# Patient Record
Sex: FEMALE | Race: BLACK OR AFRICAN AMERICAN | Employment: FULL TIME | URBAN - METROPOLITAN AREA
[De-identification: names, ages, dates, MRNs, and addresses within clinical notes are randomized per-mention and may not be internally consistent; named-entity substitution may affect disease eponyms.]

---

## 2018-05-07 ENCOUNTER — TELEPHONE (OUTPATIENT)
Dept: FAMILY MEDICINE CLINIC | Age: 38
End: 2018-05-07
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
## 2018-05-07 NOTE — TELEPHONE ENCOUNTER
Future Appointments  Date Time Provider Aleksandar Rico   5/9/2018 1:30 PM Camille Edgar MD [facility]      Patient made a ware that an appointment was needed in order for the referral an appointment was needed.

## 2018-05-09 ENCOUNTER — OFFICE VISIT (OUTPATIENT)
Dept: FAMILY MEDICINE CLINIC | Age: 38
End: 2018-05-09

## 2018-05-09 VITALS
WEIGHT: 205 LBS | HEART RATE: 88 BPM | HEIGHT: 64 IN | SYSTOLIC BLOOD PRESSURE: 124 MMHG | RESPIRATION RATE: 20 BRPM | BODY MASS INDEX: 35 KG/M2 | DIASTOLIC BLOOD PRESSURE: 86 MMHG | OXYGEN SATURATION: 98 % | TEMPERATURE: 98.7 F

## 2018-05-09 DIAGNOSIS — S46.812A TRAPEZIUS STRAIN, LEFT, INITIAL ENCOUNTER: ICD-10-CM

## 2018-05-09 DIAGNOSIS — S16.1XXA STRAIN OF NECK MUSCLE, INITIAL ENCOUNTER: Primary | ICD-10-CM

## 2018-05-09 RX ORDER — IBUPROFEN 800 MG/1
TABLET ORAL
COMMUNITY
End: 2018-05-09 | Stop reason: SDUPTHER

## 2018-05-09 RX ORDER — IBUPROFEN 800 MG/1
TABLET ORAL
Qty: 45 TAB | Refills: 1 | Status: SHIPPED | OUTPATIENT
Start: 2018-05-09 | End: 2019-01-02

## 2018-05-09 RX ORDER — CYCLOBENZAPRINE HCL 10 MG
TABLET ORAL
COMMUNITY
End: 2019-01-02 | Stop reason: ALTCHOICE
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
## 2018-05-09 NOTE — PATIENT INSTRUCTIONS
Apply warm wet compresses frequently, avoid painful activity. Take ibuprofen 800 mg, one tablet up to every 8 hours with food, stop for abdominal pain or dark stools.   Physical therapy referral
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
## 2018-05-09 NOTE — LETTER
NOTIFICATION RETURN TO WORK / SCHOOL 
 
5/9/2018 1:57 PM 
 
Ms. [address] [facility] [address] To Whom It May Concern: 
 
Judith [address] is currently under the care of [address] [place] [address]. She will return to work/school on: 5/12/2018 If there are questions or concerns please have the patient contact our office. Sincerely, Donovan Jamison MD

## 2018-05-17 ENCOUNTER — HOSPITAL ENCOUNTER (OUTPATIENT)
Dept: PHYSICAL THERAPY | Age: 38
Discharge: HOME | End: 2018-05-17
Payer: COMMERCIAL

## 2018-05-17 PROCEDURE — 97110 THERAPEUTIC EXERCISES: CPT

## 2018-05-17 PROCEDURE — 97162 PT EVAL MOD COMPLEX 30 MIN: CPT
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
## 2018-05-23 ENCOUNTER — HOSPITAL ENCOUNTER (OUTPATIENT)
Dept: PHYSICAL THERAPY | Age: 38
Discharge: HOME | End: 2018-05-23
Payer: COMMERCIAL

## 2018-05-23 PROCEDURE — 97110 THERAPEUTIC EXERCISES: CPT

## 2018-05-23 NOTE — PROGRESS NOTES
PHYSICAL THERAPY - DAILY TREATMENT NOTE    Patient Name: Tori Lutz        Date: 2018  : 1980   yes Patient  Verified  Visit #:   2     Insurance: Payor: Daisha Carrera / Plan: [address] PT / Product Type: Commerical /      In time: 200 Out time: 245   Total Treatment Time: 45     Medicare Time Tracking (below)   Total Timed Codes (min):  30 1:1 Treatment Time:  n/a     TREATMENT AREA =  Neck pain [M54.2]  Left shoulder pain [M25.512]    SUBJECTIVE  Pain Level (on 0 to 10 scale):  5  / 10   Medication Changes/New allergies or changes in medical history, any new surgeries or procedures?    no  If yes, update Summary List   Subjective Functional Status/Changes:  []  No changes reported     \"It's hard to change positions or stay in one position too long\"          OBJECTIVE  Modalities Rationale:     decrease pain and increase tissue extensibility to improve patient's ability to perform functional mobility/ADLs and attain goals.    min [] Estim, type/location:                                      []  att     []  unatt     []  w/US     []  w/ice    []  w/heat    min []  Mechanical Traction: type/lbs                   []  pro   []  sup   []  int   []  cont    []  before manual    []  after manual    min []  Ultrasound, settings/location:      min []  Iontophoresis w/ dexamethasone, location:                                               []  take home patch       []  in clinic   10 min []  Ice     [x]  Heat    location/position: C/s; Supine with LE wedge    min []  Vasopneumatic Device, press/temp:     min []  Other:    [] Skin assessment post-treatment (if applicable):    []  intact    []  redness- no adverse reaction     []redness - adverse reaction:        30 min Therapeutic Exercise:  [x]  See flow sheet   Rationale:   increase ROM and decrease symptoms to improve the patients ability to perform ADLs/IADLs, functional mobility without increased pain/symptoms        min Patient Education:  yes  Reviewed HEP   []  Progressed/Changed HEP based on: Other Objective/Functional Measures:    -new exercises added as per flow sheet with vc's provided for form/technique 100% of the time. Post Treatment Pain Level (on 0 to 10) scale:   3  / 10     ASSESSMENT  Assessment/Changes in Function:     Pt with slow, careful movements to head/neck and transfers. Pt with mm guarding and cues to reduce tone during exercises. Pt monitored to perform all exercises within tolerance today and no adverse signs/sx's.       []  See Progress Note/Recertification   Patient will continue to benefit from skilled PT services to modify and progress therapeutic interventions, address functional mobility deficits, address ROM deficits, address strength deficits, analyze and address soft tissue restrictions and analyze and cue movement patterns to attain remaining goals. Progress toward goals / Updated goals:    Initiated exercises this session     PLAN  []  Upgrade activities as tolerated yes Continue plan of care   []  Discharge due to :    []  Other:      Therapist: Adrianna Capone.  Kathleen Tse PT    Date: 5/23/2018 Time: 1:05 PM     Future Appointments  Date Time Provider Aleksandar Rico   5/23/2018 2:00 PM [address]   5/29/2018 10:30 AM [place], [facility]   5/31/2018 11:00 AM [place], [facility]   6/5/2018 10:30 AM Clinton Santiago [facility]   6/7/2018 10:30 AM Clinton Santiago [facility]   6/13/2018 11:00 AM Rashid Guido [facility]   6/14/2018 10:30 AM Rashid Guido [facility]   6/19/2018 10:30 AM Art Kareem [facility]   6/21/2018 10:00 AM Rashid Guido [facility]   6/26/2018 10:30 AM Matthew [facility]   6/28/2018 10:30 AM Rashid Guido [facility]

## 2018-05-29 ENCOUNTER — HOSPITAL ENCOUNTER (OUTPATIENT)
Dept: PHYSICAL THERAPY | Age: 38
Discharge: HOME | End: 2018-05-29
Payer: COMMERCIAL

## 2018-05-29 PROCEDURE — 97110 THERAPEUTIC EXERCISES: CPT

## 2018-05-29 NOTE — PROGRESS NOTES
PHYSICAL THERAPY - DAILY TREATMENT NOTE    Patient Name: Bhumi Hart        Date: 2018  : 1980   YES Patient  Verified  Visit #:   3    Insurance: Payor: Bharati Billingsley / Plan: [address] PT / Product Type: Commerical /      In time: 10:30 Out time: 11:25   Total Treatment Time: 55     Medicare Time Tracking (below)   Total Timed Codes (min):  NA 1:1 Treatment Time:  NA     TREATMENT AREA =  C/S, left shd    SUBJECTIVE    Pain Level (on 0 to 10 scale):  6  / 10   Medication Changes/New allergies or changes in medical history, any new surgeries or procedures? NO    If yes, update Summary List   Subjective Functional Status/Changes:  []  No changes reported     \"Its up a little.  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\"          OBJECTIVE    Modalities Rationale:    decrease pain and increase tissue extensibility to improve patient's ability to tolerate Te   min [] Estim, type/location:                                      []  att     []  unatt     []  w/US     []  w/ice    []  w/heat    min []  Mechanical Traction: type/lbs                   []  pro   []  sup   []  int   []  cont    []  before manual    []  after manual    min []  Ultrasound, settings/location:      min []  Iontophoresis w/ dexamethasone, location:                                               []  take home patch-6hour remove at        []  in clinic   10 min []  Ice     [x]  Heat    location/position: Supine with wedge    min []  Vasopneumatic Device, press/temp:     min []  Other:    [] Skin assessment post-treatment (if applicable):    []  intact    []  redness- no adverse reaction     []redness - adverse reaction:      45 min Therapeutic Exercise:  [x]  See flow sheet   Rationale:      increase ROM, increase strength and posture to improve the patients ability to decrease stress on spine      min Patient Education:  YES  Reviewed HEP   []  Progressed/Changed HEP based on:   Cont HEP Other Objective/Functional Measures:    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  Pt with c/o of fatigue throughout session but no increase in pain     Post Treatment Pain Level (on 0 to 10) scale:   3  / 10     ASSESSMENT    Assessment/Changes in Function:     Pt able tolerate increase activity today compared to prior session     []  See Progress Note/Recertification   Patient will continue to benefit from skilled PT services to analyze,, cue,, progress,, modify,, demonstrate,, instruct, and address, movement patterns,, therapeutic interventions,, postural abnormalities,, soft tissue restrictions,, ROM,, strength,, functional mobility,, body mechanics/ergonomics, and home and community integration, to attain remaining goals. Progress toward goals / Updated goals: · Short Term Goals: To be accomplished in  2  weeks:  1. Patient will demonstrate compliance with HEP. 2. Patient will demonstrate greater than or equal to 50 degrees right C/S rot AROM to facilitate driving. 3. Patient will demonstrate ability to centralize symptoms to level of C/S to facilitate independence with symptom management. pt reporting symptoms at central spine today  · Long Term Goals: To be accomplished in  4  weeks:  1. Patient will demonstrate independence with HEP. 2. Patient will demonstrate greater than or equal to 55 degrees B C/S rot AROM to facilitate driving. 3. Patient will score greater than or equal to 65/100 on FOTO to indicate improved function.      PLAN    []  Upgrade activities as tolerated YES Continue plan of care   []  Discharge due to :    []  Other:      Therapist: Jose Mcnamara DPT    Date: 5/29/2018 Time: 10:48 AM   Future Appointments  Date Time Provider Aleksandar Rico   5/31/2018 11:00 AM Edgardo Rod PT [facility]   6/5/2018 10:30 AM Fe Sotelo, [facility]   6/7/2018 10:30 AM Fe Sotelo, [facility]   6/13/2018 11:00 AM Dirk Willard [facility]   6/14/2018 10:30 AM Daljit Schultz PT [facility]   6/19/2018 10:30 AM Baldo Chapman, PT [facility]   6/21/2018 10:00 AM Daljit Schultz PT [facility]   [facility]   6/28/2018 10:30 AM Daljit Schultz PT [facility]

## 2018-05-31 ENCOUNTER — HOSPITAL ENCOUNTER (OUTPATIENT)
Dept: PHYSICAL THERAPY | Age: 38
Discharge: HOME | End: 2018-05-31
Payer: COMMERCIAL

## 2018-05-31 PROCEDURE — 97140 MANUAL THERAPY 1/> REGIONS: CPT

## 2018-05-31 PROCEDURE — 97110 THERAPEUTIC EXERCISES: CPT

## 2018-05-31 NOTE — PROGRESS NOTES
PHYSICAL THERAPY - DAILY TREATMENT NOTE    Patient Name: Adelita Singhach        Date: 2018  : 1980   YES Patient  Verified  Visit #:   4     Insurance: Payor: Alexsandra Lundberg / [address] / Product Type: Commerical /      In time: 11:00 Out time: 12:00   Total Treatment Time: 60     Medicare Time Tracking (below)   Total Timed Codes (min):  NA 1:1 Treatment Time:  NA     TREATMENT AREA =  C/S, left shd    SUBJECTIVE    Pain Level (on 0 to 10 scale):  4  / 10 shd   Medication Changes/New allergies or changes in medical history, any new surgeries or procedures?     NO    If yes, update Summary List   Subjective Functional Status/Changes:  []  No changes reported     \"My shoulder bothers me when I drive since Im left handed\"          OBJECTIVE    Modalities Rationale:    decrease inflammation, decrease pain and increase tissue extensibility to improve patient's ability to turn head without pain   min [] Estim, type/location:                                      []  att     []  unatt     []  w/US     []  w/ice    []  w/heat    min []  Mechanical Traction: type/lbs                   []  pro   []  sup   []  int   []  cont    []  before manual    []  after manual    min []  Ultrasound, settings/location:      min []  Iontophoresis w/ dexamethasone, location:                                               []  take home patch-6hour remove at        []  in clinic   10 min []  Ice     [x]  Heat    location/position: Supine with wedge    min []  Vasopneumatic Device, press/temp:     min []  Other:    [] Skin assessment post-treatment (if applicable):    []  intact    []  redness- no adverse reaction     []redness - adverse reaction:      50  (30) min Therapeutic Exercise:  [x]  See flow sheet   Rationale:      increase ROM and increase strength to improve the patients ability to perform ADLs     10 min Manual Therapy: SOR, gentle manual traction, STM and DTM to left UT and LS   Rationale: decrease pain, increase ROM and increase tissue extensibility to improve patient's ability to turn head without pain      min Patient Education:  YES  Reviewed HEP   []  Progressed/Changed HEP based on:   Cont HEP     Other Objective/Functional Measures:    Reviewed sleeping postures for decreased stress on neck  VC for form throughout session   Post Treatment Pain Level (on 0 to 10) scale:   3  / 10     ASSESSMENT    Assessment/Changes in Function:     Pt reporting no change thus far     []  See Progress Note/Recertification   Patient will continue to benefit from skilled PT services to analyze,, cue,, progress,, modify,, demonstrate,, instruct, and address, movement patterns,, therapeutic interventions,, postural abnormalities,, soft tissue restrictions,, ROM,, strength,, functional mobility,, body mechanics/ergonomics, and home and community integration, to attain remaining goals. Progress toward goals / Updated goals: · Short Term Goals: To be accomplished in  2  weeks:  1. Patient will demonstrate compliance with HEP. 2. Patient will demonstrate greater than or equal to 50 degrees right C/S rot AROM to facilitate driving. 3. Patient will demonstrate ability to centralize symptoms to level of C/S to facilitate independence with symptom management. pt reporting symptoms at central spine today  · Long Term Goals: To be accomplished in  4  weeks:  1. Patient will demonstrate independence with HEP. 2. Patient will demonstrate greater than or equal to 55 degrees B C/S rot AROM to facilitate driving. 3. Patient will score greater than or equal to 65/100 on FOTO to indicate improved function.      PLAN    []  Upgrade activities as tolerated YES Continue plan of care   []  Discharge due to :    []  Other:      Therapist: Britni Fragoso DPT    Date: 5/31/2018 Time: 11:34 AM   Future Appointments  Date Time Provider Aleksandar Rico   6/5/2018 10:30 AM Yakelin Valle [facility]   6/7/2018 10:30 AM Alpesh Szymanski Amari, PTA [facility]   6/13/2018 11:00 AM Linda Gomez, PT [facility]   6/14/2018 10:30 AM Linda Gomez, PT [facility]   6/19/2018 10:30 AM Tony Figueroa, PT [facility]   6/21/2018 10:00 AM Linda Gomez PT [facility]   6/26/2018 10:30 AM Katarina BooPearl [facility]   6/28/2018 10:30 AM Linda Gomez PT [facility]

## 2018-06-05 ENCOUNTER — HOSPITAL ENCOUNTER (OUTPATIENT)
Dept: PHYSICAL THERAPY | Age: 38
Discharge: HOME | End: 2018-06-05
Payer: COMMERCIAL

## 2018-06-05 PROCEDURE — 97530 THERAPEUTIC ACTIVITIES: CPT

## 2018-06-05 PROCEDURE — 97110 THERAPEUTIC EXERCISES: CPT

## 2018-06-05 NOTE — PROGRESS NOTES
PHYSICAL THERAPY - DAILY TREATMENT NOTE    Patient Name: Alex Perez        Date: 2018  : 1980   yes Patient  Verified  Visit #:   5     Insurance: Payor: Neema Rae / [address]: [address] / Product Type: Commerical /      In time: 9 Out time: 1140   Total Treatment Time: 65     TREATMENT AREA =  Neck pain [M54.2]  Left shoulder pain [M25.512]    SUBJECTIVE  Pain Level (on 0 to 10 scale):  4  / 10   Medication Changes/New allergies or changes in medical history, any new surgeries or procedures?    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 I am doing my HEP everyday.  I didn't know to do it more often\"   Pt reports no radic sxs in LUE       OBJECTIVE  Modalities Rationale:     decrease inflammation and decrease pain to improve patient's ability to carry/lift/reach/perform ADLs with ease, perform c/s AROM for ADLs/driving, to perform prolong sitting with ease      min [] Estim, type/location:                                      []  att     []  unatt     []  w/US     []  w/ice    []  w/heat    min []  Mechanical Traction: type/lbs                   []  pro   []  sup   []  int   []  cont    []  before manual    []  after manual    min []  Ultrasound, settings/location:      min []  Iontophoresis w/ dexamethasone, location:                                               []  take home patch       []  in clinic   10 min [x]  Ice     [x]  Heat    location/position: CP to L sh, MHP to c/s in Supine with wedge under B LEs    min []  Vasopneumatic Device, press/temp:     min []  Other:    [x] Skin assessment post-treatment (if applicable):    [x]  intact    []  redness- no adverse reaction     []redness - adverse reaction:        47 min Therapeutic Exercise:  [x]  See flow sheet   Rationale:      increase ROM and increase strength to improve the patients ability to ability to carry/lift/reach/perform ADLs with ease, perform c/s AROM for ADLs/driving, to perform prolong sitting with ease           8 min Therapeutic Activity: postural/bed mobility training   Rationale:    increase ROM and increase strength to improve the patients ability to perform proper posture, bed mobility and transfers without p!       min Patient Education:  yes  Reviewed HEP   []  Progressed/Changed HEP based on: Pt ed on importance and benefits of compliance with HEP, core strength/stability and proper posture; pt verbalized understanding        Other Objective/Functional Measures:  VCs + demo to perform proper technique for TE  c/s ret> + OP> + ext= P/B increased Left c.s Rot and decreased pain; instructed to perform every hr x 10  initiated open book at wall, 1/2 FR with scap stabs, and increased to RTB without c/o p!    demos decrease Left t/s Rot and c/o pain, instructed to perform with Left arm behind head, pt able to perform   Post Treatment Pain Level (on 0 to 10) scale:   2  / 10     ASSESSMENT  Assessment/Changes in Function:     Progressed there-ex without c/o increase p! []  See Progress Note/Recertification   Patient will continue to benefit from skilled PT services to modify and progress therapeutic interventions, address functional mobility deficits, address ROM deficits, address strength deficits, analyze and address soft tissue restrictions, analyze and cue movement patterns, analyze and modify body mechanics/ergonomics, assess and modify postural abnormalities and instruct in home and community integration to attain remaining goals. Progress toward goals / Updated goals:  Short Term Goals: To be accomplished in  2  weeks:  1. Patient will demonstrate compliance with HEP. MET  2. Patient will demonstrate greater than or equal to 50 degrees right C/S rot AROM to facilitate driving.   3. Patient will demonstrate ability to centralize symptoms to level of C/S to facilitate independence with symptom management. MET  · Long Term Goals: To be accomplished in  4  weeks:  1. Patient will demonstrate independence with HEP. 2. Patient will demonstrate greater than or equal to 55 degrees B C/S rot AROM to facilitate driving.   3. Patient will score greater than or equal to 65/100 on FOTO to indicate improved function     PLAN  []  Upgrade activities as tolerated yes Continue plan of care   []  Discharge due to :    []  Other:      Therapist: Tex Mcdaniel PTA    Date: 6/5/2018 Time: 10:26 AM     Future Appointments  Date Time Provider Aleksandar Rico   6/5/2018 10:30 AM Tex Mcdaniel PTA [facility]   6/7/2018 10:30 AM Tex Mcdaniel PTA [facility]   6/13/2018 11:00 AM Chloe Charles [facility]   6/14/2018 10:30 AM Chloe Charles PT [facility]   6/19/2018 10:30 AM Joshua Hassan PT [facility]   6/21/2018 10:00 AM Chole Charles PT [facility]   6/26/2018 10:30 AM Chris Dominguez [facility]   6/28/2018 10:30 AM Chloe Charles [facility]

## 2018-06-07 ENCOUNTER — HOSPITAL ENCOUNTER (OUTPATIENT)
Dept: PHYSICAL THERAPY | Age: 38
Discharge: HOME | End: 2018-06-07
Payer: COMMERCIAL

## 2018-06-07 PROCEDURE — 97110 THERAPEUTIC EXERCISES: CPT

## 2018-06-07 NOTE — PROGRESS NOTES
PHYSICAL THERAPY - DAILY TREATMENT NOTE    Patient Name: Junior Atkins        Date: 2018  : 1980   yes Patient  Verified  Visit #:     Insurance: Payor: Brijesh Forde / Plan: [address] PT / Product Type: Commerical /      In time: 930 Out time:    Total Treatment Time: 49     TREATMENT AREA =  Neck pain [M54.2]  Left shoulder pain [M25.512]    SUBJECTIVE  Pain Level (on 0 to 10 scale):  4  / 10   Medication Changes/New allergies or changes in medical history, any new surgeries or procedures?    no  If yes, update Summary List   Subjective Functional Status/Changes:  []  No changes reported     \"I feel better but it still hurts.  I been doing my HEP with the funny ones with neck\"       OBJECTIVE  Modalities Rationale:     decrease inflammation and decrease pain to improve patient's ability to carry/lift/reach/perform ADLs with ease, perform c/s AROM for ADLs/driving, to perform prolong sitting with ease      min [] Estim, type/location:                                      []  att     []  unatt     []  w/US     []  w/ice    []  w/heat    min []  Mechanical Traction: type/lbs                   []  pro   []  sup   []  int   []  cont    []  before manual    []  after manual    min []  Ultrasound, settings/location:      min []  Iontophoresis w/ dexamethasone, location:                                               []  take home patch       []  in clinic   10 min [x]  Ice     [x]  Heat    location/position: CP to L sh, MHP to c/s in Supine with wedge under B LEs    min []  Vasopneumatic Device, press/temp:     min []  Other:    [x] Skin assessment post-treatment (if applicable):    [x]  intact    []  redness- no adverse reaction     []redness - adverse reaction:        39 min Therapeutic Exercise:  [x]  See flow sheet   Rationale:      increase ROM and increase strength to improve the patients ability to ability to carry/lift/reach/perform ADLs with ease, perform c/s AROM for ADLs/driving, to perform prolong sitting with ease         min Patient Education:  yes  Reviewed HEP   []  Progressed/Changed HEP based on: Pt ed on importance and benefits of compliance with HEP, core strength/stability and proper posture; pt verbalized understanding        Other Objective/Functional Measures:  VCs + demo to perform proper technique for TE    continues to have difficulty with Left Rot with open book    increased to [place] without c/o p! Post Treatment Pain Level (on 0 to 10) scale:   2  / 10     ASSESSMENT  Assessment/Changes in Function:   c/s ROT AROM WNL with p! at end range  Progressed there-ex without c/o increase p! []  See Progress Note/Recertification   Patient will continue to benefit from skilled PT services to modify and progress therapeutic interventions, address functional mobility deficits, address ROM deficits, address strength deficits, analyze and address soft tissue restrictions, analyze and cue movement patterns, analyze and modify body mechanics/ergonomics, assess and modify postural abnormalities and instruct in home and community integration to attain remaining goals. Progress toward goals / Updated goals:  Short Term Goals: To be accomplished in  2  weeks:  1. Patient will demonstrate compliance with HEP. MET  2. Patient will demonstrate greater than or equal to 50 degrees right C/S rot AROM to facilitate driving. 3. Patient will demonstrate ability to centralize symptoms to level of C/S to facilitate independence with symptom management. MET  · Long Term Goals: To be accomplished in  4  weeks:  1. Patient will demonstrate independence with HEP. 2. Patient will demonstrate greater than or equal to 55 degrees B C/S rot AROM to facilitate driving.   3. Patient will score greater than or equal to 65/100 on FOTO to indicate improved function     PLAN  []  Upgrade activities as tolerated yes Continue plan of care   []  Discharge due to :    []  Other:      Therapist: Wetzel Ahumada VINCENT Fitzpatrick    Date: 6/7/2018 Time: 12:52 PM     Future Appointments  Date Time Provider Aleksanadr Trisha   6/13/2018 11:00 AM Thayer Heimlich, PT [facility]   6/14/2018 10:30 AM Thayer Heimlich, PT [facility]   6/19/2018 10:30 AM Iwona Chong [facility]   6/21/2018 10:00 AM Thayer Heimlich, PT [facility]   6/26/2018 10:30 AM [facility]   6/28/2018 10:30 AM Thayer Heimlich, PT [facility]

## 2018-06-13 ENCOUNTER — HOSPITAL ENCOUNTER (OUTPATIENT)
Dept: PHYSICAL THERAPY | Age: 38
End: 2018-06-13
Payer: COMMERCIAL

## 2018-06-14 ENCOUNTER — APPOINTMENT (OUTPATIENT)
Dept: PHYSICAL THERAPY | Age: 38
End: 2018-06-14
Payer: COMMERCIAL

## 2018-06-19 ENCOUNTER — HOSPITAL ENCOUNTER (OUTPATIENT)
Dept: PHYSICAL THERAPY | Age: 38
Discharge: HOME | End: 2018-06-19
Payer: COMMERCIAL

## 2018-06-19 PROCEDURE — 97110 THERAPEUTIC EXERCISES: CPT

## 2018-06-19 NOTE — PROGRESS NOTES
PHYSICAL THERAPY - DAILY TREATMENT NOTE    Patient Name: Dea Lesches        Date: 2018  : 1980   YES Patient  Verified  Visit #:     Insurance: Payor: Suyapa Trinidad / Plan: [address] PT / Product Type: Commerical /      In time: 10:35 Out time: 11:10   Total Treatment Time: 35     Medicare/[facility] Time Tracking (below)   Total Timed Codes (min):  NA 1:1 Treatment Time:  NA     TREATMENT AREA =  C/S, left shoulder    SUBJECTIVE    Pain Level (on 0 to 10 scale):  3  / 10   Medication Changes/New allergies or changes in medical history, any new surgeries or procedures? NO    If yes, update Summary List   Subjective Functional Status/Changes:  []  No changes reported     \"I was sick last week.  My ,ax pain is now a 5 compared to an 8\"     OVer all 75% improvement      OBJECTIVE    Modalities Rationale:    decrease pain and increase tissue extensibility to improve patient's ability to decrease tension   min [] Estim, type/location:                                      []  att     []  unatt     []  w/US     []  w/ice    []  w/heat    min []  Mechanical Traction: type/lbs                   []  pro   []  sup   []  int   []  cont    []  before manual    []  after manual    min []  Ultrasound, settings/location:      min []  Iontophoresis w/ dexamethasone, location:                                               []  take home patch-6hour remove at        []  in clinic   10 min []  Ice     [x]  Heat    location/position: Supine with wedge    min []  Vasopneumatic Device, press/temp:     min []  Other:    [] Skin assessment post-treatment (if applicable):    []  intact    []  redness- no adverse reaction     []redness - adverse reaction:      25 min Therapeutic Exercise:  [x]  See flow sheet   Rationale:      reassess and review HEP to improve the patients ability to self manage      min Patient Education:  YES  Reviewed HEP   []  Progressed/Changed HEP based on:   Issued DC HEP Other Objective/Functional Measures:  · Short Term Goals: To be accomplished in  2  weeks:  1. Patient will demonstrate compliance with HEP. 2. Patient will demonstrate greater than or equal to 50 degrees right C/S rot AROM to facilitate driving. 3. Patient will demonstrate ability to centralize symptoms to level of C/S to facilitate independence with symptom management. Pt reports no longer experiencing tingling into her UE-MET  · Long Term Goals: To be accomplished in  4  weeks:  1. Patient will demonstrate independence with HEP. 2. Patient will demonstrate greater than or equal to 55 degrees B C/S rot AROM to facilitate driving. 3. Patient will score greater than or equal to 65/100 on FOTO to indicate improved function.  strength Left 55# and 53#  Right 45# 52#   Post Treatment Pain Level (on 0 to 10) scale:   2  / 10     ASSESSMENT    Assessment/Changes in Function:     See DC note     []  See Progress Note/Recertification   Patient will continue to benefit from skilled PT services to analyze,, cue,, progress,, modify,, demonstrate,, instruct, and address, movement patterns,, therapeutic interventions,, postural abnormalities,, soft tissue restrictions,, ROM,, strength,, functional mobility,, body mechanics/ergonomics, and home and community integration, to attain remaining goals.    Progress toward goals / Updated goals:    See DC note     PLAN    []  Upgrade activities as tolerated NO Continue plan of care   []  Discharge due to :    []  Other:      Therapist: Avis Okeefe DPT    Date: 6/19/2018 Time: 10:40 AM   Future Appointments  Date Time Provider Aleksandar Rico   6/21/2018 10:00 AM Bernabe Murdock PT [facility]   6/26/2018 10:30 AM Junior Velazquez [facility]   6/28/2018 10:30 AM Bernabe Murdock PT [facility]
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
## 2018-06-21 ENCOUNTER — APPOINTMENT (OUTPATIENT)
Dept: PHYSICAL THERAPY | Age: 38
End: 2018-06-21
Payer: COMMERCIAL

## 2018-06-26 ENCOUNTER — APPOINTMENT (OUTPATIENT)
Dept: PHYSICAL THERAPY | Age: 38
End: 2018-06-26
Payer: COMMERCIAL

## 2018-06-28 ENCOUNTER — APPOINTMENT (OUTPATIENT)
Dept: PHYSICAL THERAPY | Age: 38
End: 2018-06-28
Payer: COMMERCIAL

## 2018-08-28 ENCOUNTER — TELEPHONE (OUTPATIENT)
Dept: FAMILY MEDICINE CLINIC | Age: 38
End: 2018-08-28

## 2018-12-05 ENCOUNTER — OFFICE VISIT (OUTPATIENT)
Dept: FAMILY MEDICINE CLINIC | Age: 38
End: 2018-12-05

## 2018-12-05 VITALS
BODY MASS INDEX: 33.35 KG/M2 | TEMPERATURE: 98.5 F | RESPIRATION RATE: 16 BRPM | HEART RATE: 70 BPM | SYSTOLIC BLOOD PRESSURE: 110 MMHG | WEIGHT: 188.2 LBS | HEIGHT: 63 IN | DIASTOLIC BLOOD PRESSURE: 60 MMHG | OXYGEN SATURATION: 98 %

## 2018-12-05 DIAGNOSIS — Z71.9 ENCOUNTER FOR COUNSELING: Primary | ICD-10-CM

## 2018-12-05 RX ORDER — VITAMIN E 1000 UNIT
CAPSULE ORAL
COMMUNITY
End: 2019-02-20

## 2018-12-05 NOTE — PROGRESS NOTES
Vance Noel is a 41 y.o. female (: 1980) presenting to address:    Chief Complaint   Patient presents with    Other     Here for [place] paperwork for work. Pt declined flu vaccine. There were no vitals filed for this visit. Hearing/Vision:   No exam data present    Learning Assessment:     Learning Assessment 2016   PRIMARY LEARNER Patient   HIGHEST LEVEL OF EDUCATION - PRIMARY LEARNER  GRADUATED HIGH SCHOOL OR GED   BARRIERS PRIMARY LEARNER NONE   CO-LEARNER CAREGIVER No   PRIMARY LANGUAGE ENGLISH   LEARNER PREFERENCE PRIMARY DEMONSTRATION   ANSWERED BY patient   RELATIONSHIP SELF     Depression Screening:     PHQ over the last two weeks 2018   Little interest or pleasure in doing things Not at all   Feeling down, depressed, irritable, or hopeless Not at all   Total Score PHQ 2 0     Fall Risk Assessment:   No flowsheet data found. Abuse Screening:   No flowsheet data found. Coordination of Care Questionaire:   1. Have you been to the ER, urgent care clinic since your last visit? Hospitalized since your last visit? NO    2. Have you seen or consulted any other health care providers outside of the [address] [place] [address] since your last visit? Include any pap smears or colon screening. YES    Advanced Directive:   1. Do you have an Advanced Directive? NO    2. Would you like information on Advanced Directives?  NO

## 2018-12-05 NOTE — PROGRESS NOTES
HISTORY OF PRESENT ILLNESS  [facility] Fariba Moreno is a 41 y.o. female. Ms. Boyer  reports that she is planning to have liposuction surgery in [place] and needs FMLA and preop clearance. ROS  N/A  Physical Exam  N/A  ASSESSMENT and PLAN    ICD-10-CM ICD-9-CM    1.  Encounter for counseling Z71.9 V65.40    Patient advised that attending surgeon would need to provide FMLA documentation  Preop evaluation can be completed with documentation of planned procedure, anesthesia type and specific facility requirements for lab or other requirements such as EKG/CXR if applicable

## 2018-12-05 NOTE — PATIENT INSTRUCTIONS
Patient advised that attending surgeon would need to provide FMLA documentation  Preop evaluation can be completed with documentation of planned procedure, anesthesia type and specific facility requirements for lab or other requirements such as EKG/CXR if applicable       Liposuction: Before Your Surgery  What is liposuction? Liposuction uses suction to remove fat from your body. The doctor puts a small, thin tube through very small cuts in the skin. Then the doctor moves the tube around under your skin to reach areas with more fat. Liposuction is usually done in a doctor's office, a surgery center, or a hospital. It can be major or minor surgery. It depends on how much fat is removed. You will probably go home after surgery. But if a lot of fat is removed, you may have to stay overnight. You may be asleep for the surgery. Or you may get medicine to make you relax. Most of the time, the doctor gives you a shot to numb the area. This reduces pain and bleeding. The doctor may also use a laser or ultrasound to change the solid fat to liquid. After the surgery, the area where the fat was removed will have some kind of wrap on it. You may have elastic bandages and tape on the area. Or you could have support hose, a girdle, or another type of tight clothing. These can help reduce swelling, bruising, and pain. You may have to keep the wrap on for 3 to 4 weeks. The area will probably be bruised and swollen for at least 10 to 14 days. After several weeks of soft swelling, some areas may feel hard and lumpy. Your doctor may recommend massage to help take care of any lumps. You can return to your normal activities when you feel comfortable. It may take several days to a few weeks. Most people can go back to light work in a few days. But sometimes it takes longer. Follow-up care is a key part of your treatment and safety. Be sure to make and go to all appointments, and call your doctor if you are having problems.  It's also a good idea to know your test results and keep a list of the medicines you take. What happens before surgery?   Surgery can be stressful. This information will help you understand what you can expect. And it will help you safely prepare for surgery.   Preparing for surgery    · Understand exactly what surgery is planned, along with the risks, benefits, and other options. · Tell your doctors ALL the medicines, vitamins, supplements, and herbal remedies you take. Some of these can increase the risk of bleeding or interact with anesthesia.     · If you take blood thinners, such as warfarin (Coumadin), clopidogrel (Plavix), or aspirin, be sure to talk to your doctor. He or she will tell you if you should stop taking these medicines before your surgery. Make sure that you understand exactly what your doctor wants you to do.     · Your doctor will tell you which medicines to take or stop before your surgery. You may need to stop taking certain medicines a week or more before surgery. So talk to your doctor as soon as you can.     · If you have an advance directive, let your doctor know. It may include a living will and a durable power of  for health care. Bring a copy to the hospital. If you don't have one, you may want to prepare one. It lets your doctor and loved ones know your health care wishes. Doctors advise that everyone prepare these papers before any type of surgery or procedure. What happens on the day of surgery? · Follow the instructions exactly about when to stop eating and drinking. If you don't, your surgery may be canceled. If your doctor told you to take your medicines on the day of surgery, take them with only a sip of water.     · Take a bath or shower before you come in for your surgery. Do not apply lotions, perfumes, deodorants, or nail polish.     · Do not shave the surgical site yourself.     · Take off all jewelry and piercings. And take out contact lenses, if you wear them.  At the hospital or surgery center   · Bring a picture ID.     · The area for surgery is often marked to make sure there are no errors.     · You will be kept comfortable and safe by your anesthesia provider. The anesthesia may make you sleep. Or it may just numb the area being worked on. Going home   · Be sure you have someone to drive you home. Anesthesia and pain medicine make it unsafe for you to drive.     · You will be given more specific instructions about recovering from your surgery. They will cover things like diet, wound care, follow-up care, driving, and getting back to your normal routine. When should you call your doctor? · You have questions or concerns.     · You don't understand how to prepare for your surgery.     · You become ill before the surgery (such as fever, flu, or a cold).     · You need to reschedule or have changed your mind about having the surgery. Where can you learn more? Go to http://kirill-artem.info/. Enter E897 in the search box to learn more about \"Liposuction: Before Your Surgery. \"  Current as of: April 18, 2018  Content Version: 11.8  © 8879-2152 [employer]. Care instructions adapted under license by [employer] (which disclaims liability or warranty for this information). If you have questions about a medical condition or this instruction, always ask your healthcare professional. [address] any warranty or liability for your use of this information.

## 2019-01-02 ENCOUNTER — OFFICE VISIT (OUTPATIENT)
Dept: FAMILY MEDICINE CLINIC | Age: 39
End: 2019-01-02

## 2019-01-02 VITALS
HEIGHT: 63 IN | HEART RATE: 72 BPM | RESPIRATION RATE: 16 BRPM | WEIGHT: 179.4 LBS | OXYGEN SATURATION: 99 % | BODY MASS INDEX: 31.79 KG/M2 | TEMPERATURE: 98.2 F | DIASTOLIC BLOOD PRESSURE: 60 MMHG | SYSTOLIC BLOOD PRESSURE: 102 MMHG

## 2019-01-02 DIAGNOSIS — E66.9 OBESITY (BMI 30-39.9): ICD-10-CM

## 2019-01-02 DIAGNOSIS — Z01.818 PREOP EXAMINATION: Primary | ICD-10-CM

## 2019-01-02 LAB
A-G RATIO,AGRAT: 1.4 RATIO (ref 1.1–2.6)
ABSOLUTE LYMPHOCYTE COUNT, 10803: 2.3 K/UL (ref 1–4.8)
ALBUMIN SERPL-MCNC: 4.5 G/DL (ref 3.5–5)
ALP SERPL-CCNC: 76 U/L (ref 25–115)
ALT SERPL-CCNC: 12 U/L (ref 5–40)
ANION GAP SERPL CALC-SCNC: 12 MMOL/L
APTT PPP: 27 SEC (ref 22–36)
AST SERPL W P-5'-P-CCNC: 12 U/L (ref 10–37)
AVG GLU, 10930: 98 MG/DL (ref 91–123)
BASOPHILS # BLD: 0 K/UL (ref 0–0.2)
BASOPHILS NFR BLD: 0 % (ref 0–2)
BILIRUB SERPL-MCNC: 0.5 MG/DL (ref 0.2–1.2)
BILIRUB UR QL: NEGATIVE
BUN SERPL-MCNC: 11 MG/DL (ref 6–22)
CALCIUM SERPL-MCNC: 9.6 MG/DL (ref 8.4–10.5)
CHLORIDE SERPL-SCNC: 103 MMOL/L (ref 98–110)
CO2 SERPL-SCNC: 25 MMOL/L (ref 20–32)
CREAT SERPL-MCNC: 0.8 MG/DL (ref 0.5–1.2)
EOSINOPHIL # BLD: 0.2 K/UL (ref 0–0.5)
EOSINOPHIL NFR BLD: 2 % (ref 0–6)
ERYTHROCYTE [DISTWIDTH] IN BLOOD BY AUTOMATED COUNT: 12.7 % (ref 10–15.5)
GFRAA, 66117: >60
GFRNA, 66118: >60
GLOBULIN,GLOB: 3.3 G/DL (ref 2–4)
GLUCOSE SERPL-MCNC: 90 MG/DL (ref 70–99)
GLUCOSE UR QL: NEGATIVE MG/DL
GRANULOCYTES,GRANS: 65 % (ref 40–75)
HBA1C MFR BLD HPLC: 5 % (ref 4.8–5.9)
HCT VFR BLD AUTO: 36.6 % (ref 35.1–46.5)
HGB BLD-MCNC: 12.1 G/DL (ref 11.7–15.5)
HGB UR QL STRIP: NEGATIVE
HIV -1/0/2 AG/AB WITH REFLEX, 13463: NON REACTIVE
HIV 1 & 2 AB SER-IMP: NORMAL
INR PPP: 0.9 (ref 0.89–1.29)
KETONES UR QL STRIP.AUTO: NEGATIVE MG/DL
LEUKOCYTE ESTERASE: NEGATIVE
LYMPHOCYTES, LYMLT: 28 % (ref 20–45)
MCH RBC QN AUTO: 30 PG (ref 26–34)
MCHC RBC AUTO-ENTMCNC: 33 G/DL (ref 31–36)
MCV RBC AUTO: 90 FL (ref 80–95)
MONOCYTES # BLD: 0.4 K/UL (ref 0.1–1)
MONOCYTES NFR BLD: 5 % (ref 3–12)
NEUTROPHILS # BLD AUTO: 5.4 K/UL (ref 1.8–7.7)
NITRITE UR QL STRIP.AUTO: NEGATIVE
PH UR STRIP: 5 PH (ref 5–8)
PLATELET # BLD AUTO: 241 K/UL (ref 140–440)
PMV BLD AUTO: 11.5 FL (ref 9–13)
POTASSIUM SERPL-SCNC: 4.3 MMOL/L (ref 3.5–5.5)
PREGNANCY TEST, URINE, 004036: NEGATIVE
PROT SERPL-MCNC: 7.8 G/DL (ref 6.4–8.3)
PROT UR QL STRIP: NEGATIVE MG/DL
PROT UR QL STRIP: NEGATIVE MG/DL
PROTHROMBIN TIME: 10.2 SEC (ref 9–13)
RBC # BLD AUTO: 4.09 M/UL (ref 3.8–5.2)
SODIUM SERPL-SCNC: 140 MMOL/L (ref 133–145)
SP GR UR: 1.02 (ref 1–1.03)
SP GR UR: 1.02 (ref 1–1.03)
UROBILINOGEN UR STRIP-MCNC: <2 MG/DL
WBC # BLD AUTO: 8.3 K/UL (ref 4–11)

## 2019-01-02 NOTE — PROGRESS NOTES
Liborio Billings is a 41 y.o. female (: 1980) presenting to address:    Chief Complaint   Patient presents with    Pre-op Exam     Here for Pre Op Clearance. Pt declined flu vaccine. There were no vitals filed for this visit. Hearing/Vision:   No exam data present    Learning Assessment:     Learning Assessment 2016   PRIMARY LEARNER Patient   HIGHEST LEVEL OF EDUCATION - PRIMARY LEARNER  GRADUATED HIGH SCHOOL OR GED   BARRIERS PRIMARY LEARNER NONE   CO-LEARNER CAREGIVER No   PRIMARY LANGUAGE ENGLISH   LEARNER PREFERENCE PRIMARY DEMONSTRATION   ANSWERED BY patient   RELATIONSHIP SELF     Depression Screening:     PHQ over the last two weeks 2019   Little interest or pleasure in doing things Not at all   Feeling down, depressed, irritable, or hopeless Not at all   Total Score PHQ 2 0     Fall Risk Assessment:   No flowsheet data found. Abuse Screening:   No flowsheet data found. Coordination of Care Questionaire:   1. Have you been to the ER, urgent care clinic since your last visit? Hospitalized since your last visit? NO    2. Have you seen or consulted any other health care providers outside of the [address] [place] [address] since your last visit? Include any pap smears or colon screening. YES    Advanced Directive:   1. Do you have an Advanced Directive? NO    2. Would you like information on Advanced Directives?  NO

## 2019-01-02 NOTE — PROGRESS NOTES
HISTORY OF PRESENT ILLNESS  Leah Altman is a 41 y.o. female. Ms. Tim Barrera is scheduled for an abdominoplasty and liposuction on 1/14/2018. Pre-op Exam   The history is provided by the patient and medical records. Pertinent negatives include no chest pain, no abdominal pain, no headaches and no shortness of breath. History reviewed. No pertinent past medical history. History reviewed. No pertinent surgical history. Family History   Problem Relation Age of Onset    Hypertension Father     Diabetes Brother     Diabetes Maternal Grandmother     Glaucoma Maternal Grandmother     Cancer Neg Hx      No Known Allergies      There is no immunization history on file for this patient. Social History     Tobacco Use   Smoking Status Never Smoker   Smokeless Tobacco Never Used     Social History     Substance and Sexual Activity   Alcohol Use Yes    Comment: occasional drinker      There is no problem list on file for this patient. Current Outpatient Medications:     mv,iron,min/protein supplement (MULTIVIT-IRON-MIN-PROTEIN SUPP PO), Take  by mouth., Disp: , Rfl:     ascorbic acid, vitamin C, (VITAMIN C) 1,000 mg tablet, Take  by mouth., Disp: , Rfl:     ETONOGESTREL (IMPLANON SDRM), by SubDERmal route., Disp: , Rfl:       Review of Systems   Constitutional: Negative for chills, fever and weight loss. HENT: Negative for congestion, ear pain, hearing loss and sore throat. Respiratory: Negative for cough, shortness of breath and wheezing. Cardiovascular: Negative for chest pain, palpitations and leg swelling. Gastrointestinal: Negative for abdominal pain, blood in stool, diarrhea, melena, nausea and vomiting. Genitourinary: Negative for dysuria and urgency. Musculoskeletal: Negative for joint pain and myalgias. Skin: Negative for itching and rash. Neurological: Negative for dizziness, tingling, sensory change, focal weakness and headaches.      Visit Vitals  /60 (BP 1 Location: Left arm, BP Patient Position: Sitting)   Pulse 72   Temp 98.2 °F (36.8 °C) (Oral)   Resp 16   Ht 5' 3\" (1.6 m)   Wt 179 lb 6.4 oz (81.4 kg)   SpO2 99%   BMI 31.78 kg/m²       Physical Exam   Constitutional: She is oriented to person, place, and time. She appears well-developed and well-nourished. HENT:   Head: Normocephalic. Right Ear: Tympanic membrane and ear canal normal.   Left Ear: Tympanic membrane and ear canal normal.   Mouth/Throat: Oropharynx is clear and moist.   Eyes: Conjunctivae and EOM are normal. Pupils are equal, round, and reactive to light. Neck: Neck supple. Cardiovascular: Normal rate, regular rhythm, normal heart sounds and intact distal pulses. Pulmonary/Chest: Effort normal and breath sounds normal.   Abdominal: Soft. Bowel sounds are normal. She exhibits no mass. There is no tenderness. Musculoskeletal: She exhibits no edema. Neurological: She is alert and oriented to person, place, and time. She has normal reflexes. Skin: Skin is warm and dry. Psychiatric: She has a normal mood and affect. Her behavior is normal.   Nursing note and vitals reviewed. Results for Jenice Essex (MRN [id number]) as of 1/4/2019 06:50   Ref. Range 1/2/2019 09:56 1/2/2019 10:17   WBC Latest Ref Range: 4.0 - 11.0 K/uL  8.3   RBC Latest Ref Range: 3.80 - 5.20 M/uL  4.09   HGB Latest Ref Range: 11.7 - 15.5 g/dL  12.1   HCT Latest Ref Range: 35.1 - 46.5 %  36.6   MCV Latest Ref Range: 80 - 95 fL  90   MCH Latest Ref Range: 26 - 34 pg  30   MCHC Latest Ref Range: 31 - 36 g/dL  33   RDW Latest Ref Range: 10.0 - 15.5 %  12.7   PLATELET Latest Ref Range: 140 - 440 K/uL  241   MPV Latest Ref Range: 9.0 - 13.0 fL  11.5   NEUTROPHILS Latest Ref Range: 40 - 75 %  65   Lymphocytes Latest Ref Range: 20 - 45 %  28   MONOCYTES Latest Ref Range: 3 - 12 %  5   EOSINOPHILS Latest Ref Range: 0 - 6 %  2   BASOPHILS Latest Ref Range: 0 - 2 %  0   ABS.  NEUTROPHILS Latest Ref Range: 1.8 - 7.7 K/uL 5. 4   ABS. MONOCYTES Latest Ref Range: 0.1 - 1.0 K/uL  0.4   ABS. EOSINOPHILS Latest Ref Range: 0.0 - 0.5 K/uL  0.2   ABS. BASOPHILS Latest Ref Range: 0.0 - 0.2 K/uL  0.0   Specific Gravity Latest Ref Range: 1.005 - 1.03   1.018   pH (UA) Latest Ref Range: 5.0 - 8.0 pH  5.0   Protein Latest Ref Range: Negative, mg/dL  Negative   Glucose Latest Ref Range: Negative mg/dL  Negative   Ketone Latest Ref Range: Negative mg/dL  Negative   Blood Latest Ref Range: Negative   Negative   Bilirubin Latest Ref Range: Negative   Negative   Urobilinogen Latest Ref Range: <2.0 mg/dL  <2.0   Nitrites Latest Ref Range: Negative   Negative   Leukocyte Esterase Latest Ref Range: Negative   Negative   Pregnancy test, urine Latest Ref Range: Negative   Negative   INR Latest Ref Range: 0.89 - 1.29   0.90   Prothrombin time Latest Ref Range: 9.0 - 13.0 sec  10.2   aPTT Latest Ref Range: 22 - 36 sec  27   Sodium Latest Ref Range: 133 - 145 mmol/L  140   Potassium Latest Ref Range: 3.5 - 5.5 mmol/L  4.3   Chloride Latest Ref Range: 98 - 110 mmol/L  103   CO2 Latest Ref Range: 20 - 32 mmol/L  25   Anion gap Latest Units: mmol/L  12.0   Glucose Latest Ref Range: 70 - 99 mg/dL  90   BUN Latest Ref Range: 6 - 22 mg/dL  11   Creatinine Latest Ref Range: 0.5 - 1.2 mg/dL  0.8   Calcium Latest Ref Range: 8.4 - 10.5 mg/dL  9.6   Bilirubin, total Latest Ref Range: 0.2 - 1.2 mg/dL  0.5   Protein, total Latest Ref Range: 6.4 - 8.3 g/dL  7.8   Albumin Latest Ref Range: 3.5 - 5.0 g/dL  4.5   Globulin Latest Ref Range: 2.0 - 4.0 g/dL  3.3   A-G Ratio Latest Ref Range: 1.1 - 2.6 ratio  1.4   ALT (SGPT) Latest Ref Range: 5 - 40 U/L  12   AST Latest Ref Range: 10 - 37 U/L  12   Alk.  phosphatase Latest Ref Range: 25 - 115 U/L  76   Hemoglobin A1c, (calculated) Latest Ref Range: 4.8 - 5.9 %  5.0   HCG URINE, QL Unknown  Rpt   CULTURE, URINE Unknown Rpt    HIV 1/2 AG/AB, 4TH GENERATION,W RFLX CONFIRM Unknown  Rpt   HIV INTERPRETATION Unknown  HIV-1 antigen and. Shante Hopkins RESULT: Unknown Normal    /2/2019  8:44 PM - [facility]     Component Value Flag Ref Range Units Status   Specific Gravity 1.018   1.005 - 1.03  Final   Protein Negative   Negative, mg/dL Final   Comment:    Very dilute specimens, as indicated by the specific gravity, may not contain    representative levels of hCG.  Abnormal levels of albumin, >2,000 mg/dl,    will be detected as positive for protein and may result in a false negative    result.    If pregnancy is still suspected, recollection of first morning specimen or    ordering a quantitation beta hCG on peripheral blood may be useful.    Correlate clinically. Pregnancy test, urine Negative   Negative  Final     1/3/2019  5:04 PM - [facility]     Specimen Information: Urine        Component   RESULT   Normal    Comment:   Updated: [id number]   LAB ACC#: [id number]   SOURCE: Clean Catch Urine   --FINAL REPORT--   No Growth        EKG: Atrial bradycardia, rate 56-P:QRS 1:1 Abnormal P axis, otherwise normal axes and intervals, no ischemic changes. ASSESSMENT and PLAN    ICD-10-CM ICD-9-CM    1. Preop examination Z01.818 V72.84 EKG, 12 LEAD, INITIAL      EKG, 12 LEAD, INITIAL      CBC WITH AUTOMATED DIFF      URINALYSIS W/ RFLX MICROSCOPIC      PTT      PROTHROMBIN TIME + INR      HEMOGLOBIN A1C WITH EAG      HIV 1/2 AG/AB, 4TH GENERATION,W RFLX CONFIRM      CULTURE, URINE      METABOLIC PANEL, COMPREHENSIVE      HCG URINE, QL      CULTURE, URINE      HCG URINE, QL      METABOLIC PANEL, COMPREHENSIVE      HIV 1/2 AG/AB, 4TH GENERATION,W RFLX CONFIRM      HEMOGLOBIN A1C WITH EAG      PROTHROMBIN TIME + INR      PTT      URINALYSIS W/ RFLX MICROSCOPIC      CBC WITH AUTOMATED DIFF   2. Obesity (BMI 30-39. 9) E66.9 278.00      No medical contraindications to the planned procedure, medically cleared for surgery.

## 2019-01-03 ENCOUNTER — TELEPHONE (OUTPATIENT)
Dept: FAMILY MEDICINE CLINIC | Age: 39
End: 2019-01-03

## 2019-01-03 LAB — RESULT: NORMAL

## 2019-01-03 NOTE — TELEPHONE ENCOUNTER
That's because all of her lab results are not back yet. We will notify her as soon as they are. Please also advise her that the EKG report is not a problem that will interfere with her planned surgery.

## 2019-01-03 NOTE — TELEPHONE ENCOUNTER
Pt called tong saying that she was in yesterday for a medical clearance and has not heard from Dr Jones Friday yet. Please call her and let her know if she has been cleared.  [phone]

## 2019-01-04 NOTE — PROGRESS NOTES
Please advise that lab results were essentially normal and clearance for surgery will be faxed to Dr. Justyn Wallace today.

## 2019-01-04 NOTE — PROGRESS NOTES
Called and left message for patient to contact office.  Also faxed office notes and results and clearance for surgery to Dr. Ruperto Lee

## 2019-02-20 ENCOUNTER — OFFICE VISIT (OUTPATIENT)
Dept: FAMILY MEDICINE CLINIC | Age: 39
End: 2019-02-20

## 2019-02-20 VITALS
HEIGHT: 63 IN | DIASTOLIC BLOOD PRESSURE: 72 MMHG | HEART RATE: 76 BPM | WEIGHT: 179.2 LBS | SYSTOLIC BLOOD PRESSURE: 120 MMHG | OXYGEN SATURATION: 98 % | RESPIRATION RATE: 20 BRPM | BODY MASS INDEX: 31.75 KG/M2 | TEMPERATURE: 98.3 F

## 2019-02-20 DIAGNOSIS — G89.18 POST-OPERATIVE PAIN: Primary | ICD-10-CM

## 2019-02-20 NOTE — PROGRESS NOTES
HISTORY OF PRESENT ILLNESS  Eduardo Kemp is a 41 y.o. female. Back Pain    The history is provided by the patient and medical records. This is a new problem. Episode onset: following surgery 1/14/2019. Pertinent negatives include no fever and no weight loss. Patient Active Problem List   Diagnosis Code    Obesity (BMI 30-39. 9) E66.9       Current Outpatient Medications:     mv,iron,min/protein supplement (MULTIVIT-IRON-MIN-PROTEIN SUPP PO), Take  by mouth., Disp: , Rfl:     ascorbic acid, vitamin C, (VITAMIN C) 1,000 mg tablet, Take  by mouth., Disp: , Rfl:     ETONOGESTREL (IMPLANON SDRM), by SubDERmal route., Disp: , Rfl:     No Known Allergies      Review of Systems   Constitutional: Negative for chills, fever and weight loss. Respiratory: Negative for shortness of breath. Gastrointestinal: Negative for nausea and vomiting. Musculoskeletal: Positive for back pain (back is sore since Tummy tuck, liposuction 1/14/2018, does not feel ready to return to work on 2/26/2019 as initially planned). Visit Vitals  /72 (BP 1 Location: Left arm, BP Patient Position: Sitting)   Pulse 76   Temp 98.3 °F (36.8 °C) (Oral)   Resp 20   Ht 5' 3\" (1.6 m)   Wt 179 lb 3.2 oz (81.3 kg)   LMP 12/24/2018   SpO2 98%   BMI 31.74 kg/m²       Physical Exam   Constitutional: She is oriented to person, place, and time. She appears well-developed and well-nourished. HENT:   Head: Normocephalic. Eyes: Conjunctivae and EOM are normal.   Neck: Neck supple. Cardiovascular: Normal rate, regular rhythm and normal heart sounds. Pulmonary/Chest: Effort normal and breath sounds normal.   Torso/abdomen covered with a post op brace   Musculoskeletal: She exhibits no edema. Neurological: She is alert and oriented to person, place, and time. Skin: Skin is warm and dry. Psychiatric: She has a normal mood and affect. Her behavior is normal.   Nursing note and vitals reviewed.       ASSESSMENT and PLAN    ICD-10-CM ICD-9-CM    1. Post-operative pain G89.18 338.18    Advised to discuss post op course, return to work status with attending surgeon.

## 2019-02-20 NOTE — PROGRESS NOTES
Yan Murrieta is a 41 y.o. female (: 1980) presenting to follow up on:    Chief Complaint   Patient presents with    Post-Op Problem     back surgery 5 weeks ago    Back Pain    Letter for School/Work       Vitals:    19 0918   BP: 120/72   Pulse: 76   Resp: 20   Temp: 98.3 °F (36.8 °C)   TempSrc: Oral   SpO2: 98%   Weight: 179 lb 3.2 oz (81.3 kg)   Height: 5' 3\" (1.6 m)   PainSc:   4   PainLoc: Back   LMP: 2018         Coordination of Care Questionaire:   1. Have you been to the ER, urgent care clinic since your last visit? Hospitalized since your last visit? cosmetic surger in [facility]     2. Have you seen or consulted any other health care providers outside of the [address] [place] [address] since your last visit? Include any pap smears or colon screening. no    Advanced Directive:   1. Do you have an Advanced Directive? NO    2. Would you like information on Advanced Directives?  NO    Health Maintenance Due   Topic Date Due    DTaP/Tdap/Td series (1 - Tdap) 2002    PAP AKA CERVICAL CYTOLOGY  2002

## 2019-07-25 ENCOUNTER — OFFICE VISIT (OUTPATIENT)
Dept: FAMILY MEDICINE CLINIC | Age: 39
End: 2019-07-25

## 2019-07-25 VITALS
DIASTOLIC BLOOD PRESSURE: 70 MMHG | HEART RATE: 90 BPM | OXYGEN SATURATION: 99 % | WEIGHT: 179 LBS | TEMPERATURE: 98.4 F | SYSTOLIC BLOOD PRESSURE: 115 MMHG | BODY MASS INDEX: 31.71 KG/M2 | HEIGHT: 63 IN | RESPIRATION RATE: 20 BRPM

## 2019-07-25 DIAGNOSIS — J02.9 SORE THROAT: ICD-10-CM

## 2019-07-25 DIAGNOSIS — R05.9 COUGH: ICD-10-CM

## 2019-07-25 DIAGNOSIS — J06.9 VIRAL UPPER RESPIRATORY TRACT INFECTION: Primary | ICD-10-CM

## 2019-07-25 LAB
S PYO AG THROAT QL: NEGATIVE
VALID INTERNAL CONTROL?: YES

## 2019-07-25 RX ORDER — BENZONATATE 100 MG/1
100 CAPSULE ORAL
Qty: 15 CAP | Refills: 0 | Status: SHIPPED | OUTPATIENT
Start: 2019-07-25 | End: 2019-08-01

## 2019-07-25 NOTE — PATIENT INSTRUCTIONS

## 2019-07-25 NOTE — PROGRESS NOTES
Subjective:   Valentino Pastel is a 46 y.o. female who complains of post nasal drip, cough described as dry and bilateral sinus pain for 4 days, stable since that time. She denies a history of shortness of breath and wheezing. Evaluation to date: none. Treatment to date: OTC products. Patient does not smoke cigarettes. Relevant PMH: No pertinent additional PMH. Current Outpatient Medications   Medication Sig Dispense Refill    benzonatate (TESSALON) 100 mg capsule Take 1 Cap by mouth three (3) times daily as needed for Cough for up to 7 days. 15 Cap 0    mv,iron,min/protein supplement (MULTIVIT-IRON-MIN-PROTEIN SUPP PO) Take  by mouth.  ETONOGESTREL (IMPLANON SDRM) by SubDERmal route. No Known Allergies     Review of Systems  Pertinent items are noted in HPI. Objective:     Visit Vitals  /70 (BP 1 Location: Right arm, BP Patient Position: Sitting)   Pulse 90   Temp 98.4 °F (36.9 °C) (Oral)   Resp 20   Ht 5' 3\" (1.6 m)   Wt 179 lb (81.2 kg)   LMP 07/08/2019   SpO2 99%   BMI 31.71 kg/m²     General:  alert, cooperative, no distress   Eyes: negative   Ears: normal TM's and external ear canals AU   Sinuses: Normal paranasal sinuses without tenderness   Mouth:  Lips, mucosa, and tongue normal. Teeth and gums normal   Neck: supple, symmetrical, trachea midline and no adenopathy. Heart: S1 and S2 normal, no murmurs noted. Lungs: clear to auscultation bilaterally   Abdomen: soft, non-tender. Bowel sounds normal. No masses,  no organomegaly        Assessment/Plan:   viral upper respiratory illness  Suggested symptomatic OTC remedies. RTC prn. Discussed diagnosis and treatment of viral URIs. Discussed the importance of avoiding unnecessary antibiotic therapy. Encounter Diagnoses   Name Primary?  Viral upper respiratory tract infection Yes    Sore throat     Cough      Orders Placed This Encounter    AMB POC RAPID STREP A    benzonatate (TESSALON) 100 mg capsule   .

## 2019-07-25 NOTE — LETTER
NOTIFICATION RETURN TO WORK / SCHOOL 
 
7/25/2019 12:01 PM 
 
Ms. Judith [address] [facility] [facility] [id number] To Whom It May Concern: 
 
Judith [address] is currently under the care of [address] [place] [address]. She will return to work/school on: 7/27/2019 If there are questions or concerns please have the patient contact our office.  
 
 
 
Sincerely, 
 
 
Anabell Keenan NP

## 2019-07-25 NOTE — PROGRESS NOTES
Susan Shah is a 46 y.o. female (: 1980) presenting to address:    Chief Complaint   Patient presents with    Ear Pain     x four days      pain scale 6/10    Sore Throat       Vitals:    19 1136   BP: 115/70   Pulse: 90   Resp: 20   Temp: 98.4 °F (36.9 °C)   TempSrc: Oral   SpO2: 99%   Weight: 179 lb (81.2 kg)   Height: 5' 3\" (1.6 m)   PainSc:   8   PainLoc: Throat   LMP: 2019       Hearing/Vision:   No exam data present    Learning Assessment:     Learning Assessment 2016   PRIMARY LEARNER Patient   HIGHEST LEVEL OF EDUCATION - PRIMARY LEARNER  GRADUATED HIGH SCHOOL OR GED   BARRIERS PRIMARY LEARNER NONE   CO-LEARNER CAREGIVER No   PRIMARY LANGUAGE ENGLISH   LEARNER PREFERENCE PRIMARY DEMONSTRATION   ANSWERED BY patient   RELATIONSHIP SELF     Depression Screening:     3 most recent PHQ Screens 2019   Little interest or pleasure in doing things Not at all   Feeling down, depressed, irritable, or hopeless Not at all   Total Score PHQ 2 0     Fall Risk Assessment:   No flowsheet data found. Abuse Screening:   No flowsheet data found. Coordination of Care Questionaire:   1. Have you been to the ER, urgent care clinic since your last visit? Hospitalized since your last visit? NO    2. Have you seen or consulted any other health care providers outside of the [address] [place] [address] since your last visit? Include any pap smears or colon screening. NO    Advanced Directive:   1. Do you have an Advanced Directive? NO    2. Would you like information on Advanced Directives?  NO

## 2019-08-06 ENCOUNTER — CLINICAL SUPPORT (OUTPATIENT)
Dept: FAMILY MEDICINE CLINIC | Age: 39
End: 2019-08-06

## 2019-08-06 DIAGNOSIS — Z11.1 ENCOUNTER FOR PPD TEST: Primary | ICD-10-CM

## 2019-08-06 NOTE — PROGRESS NOTES
Tuberculin skin test applied to left ventral forearm. Patient will return for reading in 48-72 hours.

## 2019-08-08 ENCOUNTER — CLINICAL SUPPORT (OUTPATIENT)
Dept: FAMILY MEDICINE CLINIC | Age: 39
End: 2019-08-08

## 2019-08-08 DIAGNOSIS — Z11.1 ENCOUNTER FOR PPD SKIN TEST READING: Primary | ICD-10-CM

## 2019-08-08 LAB
MM INDURATION POC: 0 MM (ref 0–5)
PPD POC: NEGATIVE NEGATIVE

## 2019-08-08 NOTE — PROGRESS NOTES
PPD Reading Note  PPD read and results entered in [employer]. Result: 0 mm induration.   Interpretation: Negative  If test not read within 48-72 hours of initial placement, patient advised to repeat in other arm 1-3 weeks after this test.  Allergic reaction: no

## 2019-08-12 ENCOUNTER — TELEPHONE (OUTPATIENT)
Dept: FAMILY MEDICINE CLINIC | Age: 39
End: 2019-08-12

## 2022-02-24 NOTE — PROGRESS NOTES
HISTORY OF PRESENT ILLNESS  Avril Young is a 37 y.o. female. She presents with concern about swelling in her legs recently and is due for health assessment and preventative care. Notes though that the leg swelling occurred after she had been working long hours and as soon as she she had a few days off work the swelling resolved completely. Mr#: [id number]      No past medical history on file. No past surgical history on file. Family History   Problem Relation Age of Onset    Hypertension Father     Diabetes Brother     Diabetes Maternal Grandmother     Glaucoma Maternal Grandmother     Cancer Neg Hx        No Known Allergies    Social History     Tobacco Use   Smoking Status Never Smoker   Smokeless Tobacco Never Used       Social History     Substance and Sexual Activity   Alcohol Use Yes    Comment: occasional drinker          Patient Active Problem List   Diagnosis Code    Obesity (BMI 30-39. 9) E66.9         Current Outpatient Medications:     mv,iron,min/protein supplement (MULTIVIT-IRON-MIN-PROTEIN SUPP PO), Take  by mouth., Disp: , Rfl:     ETONOGESTREL (IMPLANON SDRM), by SubDERmal route. Had removed . Will be getting a new one placed 3/1/22, Disp: , Rfl:        Review of Systems   Constitutional: Negative for chills, fever and weight loss. HENT: Negative for congestion, ear pain, hearing loss and sore throat. Eyes: Negative for blurred vision and double vision. Respiratory: Negative for cough, shortness of breath and wheezing. Cardiovascular: Positive for leg swelling ( See HPI). Negative for chest pain and palpitations. Gastrointestinal: Negative for abdominal pain, blood in stool, constipation, diarrhea, heartburn, melena, nausea and vomiting. Genitourinary: Negative for dysuria and urgency. Musculoskeletal: Negative for joint pain and myalgias. Skin: Negative for itching and rash.    Neurological: Negative for dizziness, tingling, sensory change, focal weakness and headaches. Endo/Heme/Allergies: Negative for environmental allergies. Psychiatric/Behavioral: Negative for depression. The patient is not nervous/anxious and does not have insomnia. Visit Vitals  /80   Pulse 62   Temp 97.8 °F (36.6 °C) (Temporal)   Resp 16   Ht 5' 3\" (1.6 m)   Wt 199 lb (90.3 kg)   LMP 02/22/2022   SpO2 98%   BMI 35.25 kg/m²       Physical Exam  Vitals and nursing note reviewed. Constitutional:       General: She is not in acute distress. Appearance: Normal appearance. She is obese. She is not ill-appearing. HENT:      Head: Normocephalic. Right Ear: Tympanic membrane, ear canal and external ear normal.      Left Ear: Tympanic membrane, ear canal and external ear normal.   Eyes:      Extraocular Movements: Extraocular movements intact. Conjunctiva/sclera: Conjunctivae normal.      Pupils: Pupils are equal, round, and reactive to light. Neck:      Vascular: No carotid bruit. Cardiovascular:      Rate and Rhythm: Normal rate and regular rhythm. Heart sounds: Normal heart sounds. Pulmonary:      Effort: Pulmonary effort is normal.      Breath sounds: Normal breath sounds. Abdominal:      Palpations: Abdomen is soft. Tenderness: There is no abdominal tenderness. Musculoskeletal:         General: No deformity. Cervical back: Neck supple. Right lower leg: No edema. Left lower leg: No edema. Skin:     General: Skin is warm and dry. Neurological:      Mental Status: She is alert and oriented to person, place, and time. Psychiatric:         Mood and Affect: Mood normal.         Behavior: Behavior normal.         Thought Content: Thought content normal.         ASSESSMENT and PLAN    ICD-10-CM ICD-9-CM    1.  Routine general medical examination at a health care facility  Z00.00 V70.0 CBC WITH AUTOMATED DIFF      HEMOGLOBIN A1C WITH EAG      URINALYSIS W/ RFLX MICROSCOPIC      TSH 3RD GENERATION      METABOLIC PANEL, COMPREHENSIVE LIPID PANEL      HEPATITIS C AB   2. Obesity (BMI 30.0-34. 9)  E66.9 278.00    Assessment:      Health maintenance recommendations:  Influenza immunization-declines  Tdap immunization- completed 2019 per patient  Pap smear-2/23/2022  Hepatitis C screen    Plan:  Lab studies ordered, further disposition pending lab results if indicated  Avoid dietary starch and sugar and follow program of regular aerobic exercise  Return for annual physical exam and follow-up in 1 year or sooner with problems    Sol MD Dominique      PLEASE NOTE:   This document has been produced using voice recognition software. Unrecognized errors in transcription may be present.

## 2022-02-25 ENCOUNTER — HOSPITAL ENCOUNTER (OUTPATIENT)
Dept: LAB | Age: 42
Discharge: HOME | End: 2022-02-25
Payer: COMMERCIAL

## 2022-02-25 ENCOUNTER — OFFICE VISIT (OUTPATIENT)
Dept: FAMILY MEDICINE CLINIC | Age: 42
End: 2022-02-25
Payer: COMMERCIAL

## 2022-02-25 ENCOUNTER — APPOINTMENT (OUTPATIENT)
Dept: FAMILY MEDICINE CLINIC | Age: 42
End: 2022-02-25

## 2022-02-25 VITALS
DIASTOLIC BLOOD PRESSURE: 80 MMHG | BODY MASS INDEX: 35.26 KG/M2 | HEART RATE: 62 BPM | HEIGHT: 63 IN | WEIGHT: 199 LBS | TEMPERATURE: 97.8 F | OXYGEN SATURATION: 98 % | RESPIRATION RATE: 16 BRPM | SYSTOLIC BLOOD PRESSURE: 118 MMHG

## 2022-02-25 DIAGNOSIS — Z00.00 ROUTINE GENERAL MEDICAL EXAMINATION AT A HEALTH CARE FACILITY: Primary | ICD-10-CM

## 2022-02-25 DIAGNOSIS — E66.9 OBESITY (BMI 30.0-34.9): ICD-10-CM

## 2022-02-25 DIAGNOSIS — Z00.00 ROUTINE GENERAL MEDICAL EXAMINATION AT A HEALTH CARE FACILITY: ICD-10-CM

## 2022-02-25 LAB
ALBUMIN SERPL-MCNC: 3.4 G/DL (ref 3.4–5)
ALBUMIN/GLOB SERPL: 0.8 {RATIO} (ref 0.8–1.7)
ALP SERPL-CCNC: 62 U/L (ref 45–117)
ALT SERPL-CCNC: 16 U/L (ref 13–56)
ANION GAP SERPL CALC-SCNC: 2 MMOL/L (ref 3–18)
AST SERPL-CCNC: 12 U/L (ref 10–38)
BASOPHILS # BLD: 0 K/UL (ref 0–0.1)
BASOPHILS NFR BLD: 0 % (ref 0–2)
BILIRUB SERPL-MCNC: 1 MG/DL (ref 0.2–1)
BUN SERPL-MCNC: 10 MG/DL (ref 7–18)
BUN/CREAT SERPL: 12 (ref 12–20)
CALCIUM SERPL-MCNC: 9.4 MG/DL (ref 8.5–10.1)
CHLORIDE SERPL-SCNC: 106 MMOL/L (ref 100–111)
CHOLEST SERPL-MCNC: 153 MG/DL
CO2 SERPL-SCNC: 29 MMOL/L (ref 21–32)
CREAT SERPL-MCNC: 0.82 MG/DL (ref 0.6–1.3)
DIFFERENTIAL METHOD BLD: ABNORMAL
EOSINOPHIL # BLD: 0.1 K/UL (ref 0–0.4)
EOSINOPHIL NFR BLD: 1 % (ref 0–5)
ERYTHROCYTE [DISTWIDTH] IN BLOOD BY AUTOMATED COUNT: 12.4 % (ref 11.6–14.5)
EST. AVERAGE GLUCOSE BLD GHB EST-MCNC: 97 MG/DL
GLOBULIN SER CALC-MCNC: 4.3 G/DL (ref 2–4)
GLUCOSE SERPL-MCNC: 85 MG/DL (ref 74–99)
HBA1C MFR BLD: 5 % (ref 4.2–5.6)
HCT VFR BLD AUTO: 37.6 % (ref 35–45)
HDLC SERPL-MCNC: 58 MG/DL (ref 40–60)
HDLC SERPL: 2.6 {RATIO} (ref 0–5)
HGB BLD-MCNC: 12.2 G/DL (ref 12–16)
IMM GRANULOCYTES # BLD AUTO: 0 K/UL (ref 0–0.04)
IMM GRANULOCYTES NFR BLD AUTO: 0 % (ref 0–0.5)
LDLC SERPL CALC-MCNC: 80.8 MG/DL (ref 0–100)
LIPID PROFILE,FLP: NORMAL
LYMPHOCYTES # BLD: 2.2 K/UL (ref 0.9–3.6)
LYMPHOCYTES NFR BLD: 23 % (ref 21–52)
MCH RBC QN AUTO: 29.1 PG (ref 24–34)
MCHC RBC AUTO-ENTMCNC: 32.4 G/DL (ref 31–37)
MCV RBC AUTO: 89.7 FL (ref 78–100)
MONOCYTES # BLD: 0.5 K/UL (ref 0.05–1.2)
MONOCYTES NFR BLD: 5 % (ref 3–10)
NEUTS SEG # BLD: 6.6 K/UL (ref 1.8–8)
NEUTS SEG NFR BLD: 70 % (ref 40–73)
NRBC # BLD: 0 K/UL (ref 0–0.01)
NRBC BLD-RTO: 0 PER 100 WBC
PLATELET # BLD AUTO: 216 K/UL (ref 135–420)
PMV BLD AUTO: 11.6 FL (ref 9.2–11.8)
POTASSIUM SERPL-SCNC: 4.4 MMOL/L (ref 3.5–5.5)
PROT SERPL-MCNC: 7.7 G/DL (ref 6.4–8.2)
RBC # BLD AUTO: 4.19 M/UL (ref 4.2–5.3)
SODIUM SERPL-SCNC: 137 MMOL/L (ref 136–145)
TRIGL SERPL-MCNC: 71 MG/DL (ref ?–150)
TSH SERPL DL<=0.05 MIU/L-ACNC: 0.48 UIU/ML (ref 0.36–3.74)
VLDLC SERPL CALC-MCNC: 14.2 MG/DL
WBC # BLD AUTO: 9.4 K/UL (ref 4.6–13.2)

## 2022-02-25 PROCEDURE — 80061 LIPID PANEL: CPT

## 2022-02-25 PROCEDURE — 85025 COMPLETE CBC W/AUTO DIFF WBC: CPT

## 2022-02-25 PROCEDURE — 80053 COMPREHEN METABOLIC PANEL: CPT

## 2022-02-25 PROCEDURE — 86803 HEPATITIS C AB TEST: CPT

## 2022-02-25 PROCEDURE — 99396 PREV VISIT EST AGE 40-64: CPT | Performed by: FAMILY MEDICINE

## 2022-02-25 PROCEDURE — 84443 ASSAY THYROID STIM HORMONE: CPT

## 2022-02-25 PROCEDURE — 81001 URINALYSIS AUTO W/SCOPE: CPT

## 2022-02-25 PROCEDURE — 83036 HEMOGLOBIN GLYCOSYLATED A1C: CPT

## 2022-02-25 PROCEDURE — 36415 COLL VENOUS BLD VENIPUNCTURE: CPT

## 2022-02-25 NOTE — PATIENT INSTRUCTIONS
Elevate the legs above horizontal as often as possible. Avoid salt and prolonged sitting and standing. Consider support hose or compression stockings.   Lab studies ordered, further disposition pending lab results if indicated  Avoid dietary starch and sugar and follow program of regular aerobic exercise  Return for annual physical exam and follow-up in 1 year or sooner with problems

## 2022-02-28 LAB
APPEARANCE UR: ABNORMAL
BACTERIA URNS QL MICRO: ABNORMAL /HPF
BILIRUB UR QL: NEGATIVE
COLOR UR: YELLOW
EPITH CASTS URNS QL MICRO: ABNORMAL /LPF (ref 0–5)
GLUCOSE UR STRIP.AUTO-MCNC: NEGATIVE MG/DL
HCV AB SER IA-ACNC: <0.02 INDEX
HCV AB SERPL QL IA: NEGATIVE
HCV COMMENT,HCGAC: NORMAL
HGB UR QL STRIP: ABNORMAL
KETONES UR QL STRIP.AUTO: ABNORMAL MG/DL
LEUKOCYTE ESTERASE UR QL STRIP.AUTO: NEGATIVE
NITRITE UR QL STRIP.AUTO: NEGATIVE
PH UR STRIP: 6 [PH] (ref 5–8)
PROT UR STRIP-MCNC: 30 MG/DL
RBC #/AREA URNS HPF: ABNORMAL /HPF (ref 0–5)
SP GR UR REFRACTOMETRY: 1.03 (ref 1–1.03)
UROBILINOGEN UR QL STRIP.AUTO: 0.2 EU/DL (ref 0.2–1)
WBC URNS QL MICRO: ABNORMAL /HPF (ref 0–4)

## 2022-03-19 PROBLEM — E66.9 OBESITY (BMI 30-39.9): Status: ACTIVE | Noted: 2019-01-02

## 2022-04-28 ENCOUNTER — OFFICE VISIT (OUTPATIENT)
Dept: FAMILY MEDICINE CLINIC | Age: 42
End: 2022-04-28
Payer: COMMERCIAL

## 2022-04-28 VITALS
SYSTOLIC BLOOD PRESSURE: 120 MMHG | RESPIRATION RATE: 16 BRPM | WEIGHT: 210 LBS | BODY MASS INDEX: 37.21 KG/M2 | HEART RATE: 112 BPM | TEMPERATURE: 97.9 F | DIASTOLIC BLOOD PRESSURE: 80 MMHG | HEIGHT: 63 IN | OXYGEN SATURATION: 98 %

## 2022-04-28 DIAGNOSIS — R60.0 BILATERAL LEG EDEMA: Primary | ICD-10-CM

## 2022-04-28 PROCEDURE — 99213 OFFICE O/P EST LOW 20 MIN: CPT | Performed by: FAMILY MEDICINE

## 2022-04-28 NOTE — PROGRESS NOTES
HISTORY OF PRESENT ILLNESS  Desmond Orosco is a 40 y.o. female. Ms. Osman Duong was seen on 2/25/2022 for her annual physical exam and reported a recent problem with lower extremity edema after working long hours which had essentially resolved after a few days off from work. Today she reports that she needs forms completed in order to return to work at her job in the [employer]. Mr#: [id number]      History reviewed. No pertinent past medical history. History reviewed. No pertinent surgical history. Family History   Problem Relation Age of Onset    Hypertension Father     Diabetes Brother     Diabetes Maternal Grandmother     Glaucoma Maternal Grandmother     Cancer Neg Hx        No Known Allergies    Social History     Tobacco Use   Smoking Status Never Smoker   Smokeless Tobacco Never Used       Social History     Substance and Sexual Activity   Alcohol Use Yes    Comment: occasional drinker          Patient Active Problem List   Diagnosis Code    Obesity (BMI 30-39. 9) E66.9         Current Outpatient Medications:     mv,iron,min/protein supplement (MULTIVIT-IRON-MIN-PROTEIN SUPP PO), Take  by mouth., Disp: , Rfl:     ETONOGESTREL (IMPLANON SDRM), by SubDERmal route. Had removed . Will be getting a new one placed 3/1/22, Disp: , Rfl:        Review of Systems   Cardiovascular: Positive for leg swelling ( Resolved). Negative for chest pain, palpitations, orthopnea and claudication. Visit Vitals  /80   Pulse (!) 112   Temp 97.9 °F (36.6 °C) (Temporal)   Resp 16   Ht 5' 3\" (1.6 m)   Wt 210 lb (95.3 kg)   LMP 02/23/2022 Comment: has nexplanon    SpO2 98%   BMI 37.20 kg/m²       Physical Exam  Vitals and nursing note reviewed. Constitutional:       General: She is not in acute distress. Appearance: Normal appearance. She is well-developed. She is obese. She is not ill-appearing. HENT:      Head: Normocephalic. Eyes:      Extraocular Movements: Extraocular movements intact. Cardiovascular:      Rate and Rhythm: Normal rate. Pulmonary:      Effort: Pulmonary effort is normal.   Musculoskeletal:      Cervical back: Neck supple. Right lower leg: No edema. Left lower leg: No edema. Neurological:      Mental Status: She is alert and oriented to person, place, and time. Psychiatric:         Mood and Affect: Mood normal.         Behavior: Behavior normal.         ASSESSMENT and PLAN    ICD-10-CM ICD-9-CM    1. Bilateral leg edema  R60.0 782. 3       Assessment:  History of leg after prolonged standing, resolved    Health maintenance recommendations: Tdap immunization    Plan:   Forms completed for return to work \"fit for sea duty\"  Return for annual physical exam in February or sooner with any problems    Zelalem Washington MD      PLEASE NOTE:   This document has been produced using voice recognition software. Unrecognized errors in transcription may be present.

## 2022-05-04 ENCOUNTER — TELEPHONE (OUTPATIENT)
Dept: FAMILY MEDICINE CLINIC | Age: 42
End: 2022-05-04

## 2022-05-04 NOTE — TELEPHONE ENCOUNTER
----- Message from Daisha Camacho sent at 5/4/2022  8:29 AM EDT -----  Subject: Message to Provider    QUESTIONS  Information for Provider? Patient calling to ask that paperwork for her   return to work release include the swelling in her feet and breast pain. please call patient when paperwork is complete.   ---------------------------------------------------------------------------  --------------  CALL BACK INFO  What is the best way for the office to contact you? OK to leave message on   voicemail  Preferred Call Back Phone Number? [phone]  ---------------------------------------------------------------------------  --------------  SCRIPT ANSWERS  Relationship to Patient?  Self

## 2022-05-05 NOTE — TELEPHONE ENCOUNTER
I spoke with patient and notified her Dr Harlan Arias did not seen her for breast pain only the leg swelling . Patient said she saw her gyn for the breast pain and is waiting for her office to call back to let her know if they can complete paperwork for this issue . Pt seen and evaluated with PA. Agree with plan.

## 2023-02-01 ENCOUNTER — OFFICE VISIT (OUTPATIENT)
Dept: FAMILY MEDICINE CLINIC | Age: 43
End: 2023-02-01
Payer: COMMERCIAL

## 2023-02-01 VITALS
HEART RATE: 92 BPM | DIASTOLIC BLOOD PRESSURE: 85 MMHG | RESPIRATION RATE: 18 BRPM | HEIGHT: 63 IN | BODY MASS INDEX: 35.97 KG/M2 | TEMPERATURE: 97.3 F | SYSTOLIC BLOOD PRESSURE: 134 MMHG | WEIGHT: 203 LBS | OXYGEN SATURATION: 100 %

## 2023-02-01 DIAGNOSIS — S46.811A STRAIN OF RIGHT TRAPEZIUS MUSCLE, INITIAL ENCOUNTER: Primary | ICD-10-CM

## 2023-02-01 PROCEDURE — 99213 OFFICE O/P EST LOW 20 MIN: CPT | Performed by: INTERNAL MEDICINE

## 2023-02-01 RX ORDER — DICLOFENAC SODIUM 75 MG/1
75 TABLET, DELAYED RELEASE ORAL
Qty: 30 TABLET | Refills: 0 | Status: SHIPPED | OUTPATIENT
Start: 2023-02-01 | End: 2023-02-16

## 2023-02-01 RX ORDER — METHOCARBAMOL 500 MG/1
500 TABLET, FILM COATED ORAL
Qty: 45 TABLET | Refills: 0 | Status: SHIPPED | OUTPATIENT
Start: 2023-02-01 | End: 2023-02-16

## 2023-02-01 NOTE — PROGRESS NOTES
HISTORY OF PRESENT ILLNESS  Manuel Coyle is a 40 y.o. female. HPI  C/o right side neck/shoulder pain, started 2 weeks ago, 8/10, not any better, no recent trauma or fall, no arm pain/weakness or numbness  Review of Systems   Constitutional:  Negative for fever. HENT:  Negative for ear pain. Cardiovascular:  Negative for chest pain and palpitations. Gastrointestinal:  Negative for nausea and vomiting. Neurological:  Negative for dizziness, tingling and focal weakness. Physical Exam  Vitals reviewed. Cardiovascular:      Rate and Rhythm: Normal rate and regular rhythm. Pulmonary:      Effort: Pulmonary effort is normal.      Breath sounds: Normal breath sounds. Musculoskeletal:      Right shoulder: No tenderness. Normal range of motion. Cervical back: Spasms and tenderness present. No swelling, erythema or bony tenderness. Decreased range of motion. Back:        ASSESSMENT and PLAN  Diagnoses and all orders for this visit:    1. Strain of right trapezius muscle, initial encounter  -     methocarbamoL (ROBAXIN) 500 mg tablet; Take 1 Tablet by mouth three (3) times daily as needed for Muscle Spasm(s) or Pain for up to 15 days. -     diclofenac EC (VOLTAREN) 75 mg EC tablet; Take 1 Tablet by mouth two (2) times daily as needed for Pain for up to 15 days. Home exercises given today      Follow-up and Dispositions    Return if symptoms worsen or fail to improve.

## 2023-02-01 NOTE — PROGRESS NOTES
Krzysztof Kirkland is a 40 y.o. female (: 1980) presenting to address:    Chief Complaint   Patient presents with    Neck Pain       Vitals:    23 1434   BP: 134/85   Pulse: 92   Resp: 18   Temp: 97.3 °F (36.3 °C)   TempSrc: Temporal   SpO2: 100%   Weight: 203 lb (92.1 kg)   Height: 5' 3\" (1.6 m)   PainSc:   8   PainLoc: Shoulder       Hearing/Vision:   No results found. Learning Assessment:     Learning Assessment 2016   PRIMARY LEARNER Patient   HIGHEST LEVEL OF EDUCATION - PRIMARY LEARNER  GRADUATED HIGH SCHOOL OR GED   BARRIERS PRIMARY LEARNER NONE   CO-LEARNER CAREGIVER No   PRIMARY LANGUAGE ENGLISH   LEARNER PREFERENCE PRIMARY DEMONSTRATION   ANSWERED BY patient   RELATIONSHIP SELF     Depression Screening:     3 most recent PHQ Screens 2023   Little interest or pleasure in doing things Not at all   Feeling down, depressed, irritable, or hopeless Not at all   Total Score PHQ 2 0     Fall Risk Assessment:     Fall Risk Assessment, last 12 mths 2023   Able to walk? Yes   Fall in past 12 months? 0   Do you feel unsteady? 0   Are you worried about falling 0     Abuse Screening:     Abuse Screening Questionnaire 2023   Do you ever feel afraid of your partner? N   Are you in a relationship with someone who physically or mentally threatens you? N   Is it safe for you to go home? Y     ADL Assessment:   No flowsheet data found. Coordination of Care Questionaire:   1. \"Have you been to the ER, urgent care clinic since your last visit? Hospitalized since your last visit? \" No    2. \"Have you seen or consulted any other health care providers outside of the [address] [place] [address] since your last visit? \" No     3. For patients aged 39-70: Has the patient had a colonoscopy? NA - based on age     If the patient is female:    4. For patients aged 41-77: Has the patient had a mammogram within the past 2 years? NA - based on age    11.  For patients aged 21-65: Has the patient had a pap smear? Yes - no Care Gap present    Advanced Directive:   1. Do you have an Advanced Directive? NO    2. Would you like information on Advanced Directives?  NO

## 2023-02-26 DIAGNOSIS — Z00.00 ROUTINE HEALTH MAINTENANCE: Primary | ICD-10-CM

## 2023-02-26 ASSESSMENT — ENCOUNTER SYMPTOMS
ANAL BLEEDING: 0
CONSTIPATION: 0
WHEEZING: 0
VOMITING: 0
DIARRHEA: 0
NAUSEA: 0
COUGH: 0
BLOOD IN STOOL: 0
SHORTNESS OF BREATH: 0
ABDOMINAL PAIN: 0
SORE THROAT: 0

## 2023-02-26 NOTE — PROGRESS NOTES
HISTORY OF PRESENT ILLNESS  Zeenat Sancehz  is a 40 y.o. y.o. female    She presents for health assessment, preventative care and follow-up after treatment with methocarbamol and diclofenac for a right trapezius strain 3 weeks ago. Mr#: [id number]      History reviewed. No pertinent past medical history. History reviewed. No pertinent surgical history. Family History   Problem Relation Age of Onset    Diabetes Maternal Grandmother     Cancer Neg Hx     Glaucoma Maternal Grandmother     Diabetes Brother     Hypertension Father        Not on File    Social History     Tobacco Use   Smoking Status Never   Smokeless Tobacco Never       Social History     Substance and Sexual Activity   Alcohol Use Yes       Immunization History   Administered Date(s) Administered    COVID-19, J&J, (age 18y+), IM, 0.5 mL 04/08/2021    COVID-19, PFIZER PURPLE top, DILUTE for use, (age 15 y+), 30mcg/0.3mL 12/14/2021    Hepatitis A Adult (Havrix, Vaqta) 09/24/2019, 08/30/2021    Influenza Quadv 11/01/2016    PPD Test 08/06/2019    Td (Adult), 5 Lf Tetanus Toxoid, Pf (Tenivac, Decavac) 09/24/2019    Yellow Fever (YF-Vax) 11/12/2019       Patient Active Problem List   Diagnosis    Obesity (BMI 30-39. 9)         Current Outpatient Medications:     AZURETTE 0.15-0.02/0.01 MG (21/5) per tablet, Take by mouth daily, Disp: , Rfl:       Review of Systems   Constitutional:  Negative for activity change, appetite change, fever and unexpected weight change. HENT:  Negative for congestion, ear pain, hearing loss, sore throat and tinnitus. Eyes:  Negative for visual disturbance. Respiratory:  Negative for cough, shortness of breath and wheezing. Cardiovascular:  Negative for chest pain, palpitations and leg swelling. Gastrointestinal:  Negative for abdominal pain, anal bleeding, blood in stool, constipation, diarrhea, nausea and vomiting.    Genitourinary:  Negative for difficulty urinating, dysuria, hematuria and menstrual problem. Allergic/Immunologic: Positive for environmental allergies. Negative for food allergies. Neurological:  Negative for dizziness, light-headedness, numbness and headaches. Psychiatric/Behavioral:  Negative for dysphoric mood and suicidal ideas. The patient is not nervous/anxious. /84   Pulse 92   Temp 97.3 °F (36.3 °C) (Temporal)   Resp 16   Ht 5' 3\" (1.6 m)   Wt 206 lb (93.4 kg)   LMP 01/20/2023 Comment: january 20 th to feb 10th  SpO2 98%   BMI 36.49 kg/m²     Physical Exam  Constitutional:       General: She is not in acute distress. Appearance: Normal appearance. She is obese. She is not ill-appearing. HENT:      Head: Normocephalic. Right Ear: Tympanic membrane, ear canal and external ear normal.      Left Ear: Tympanic membrane, ear canal and external ear normal.      Mouth/Throat:      Mouth: Mucous membranes are moist.      Pharynx: Oropharynx is clear. Eyes:      Extraocular Movements: Extraocular movements intact. Conjunctiva/sclera: Conjunctivae normal.      Pupils: Pupils are equal, round, and reactive to light. Neck:      Vascular: No carotid bruit. Cardiovascular:      Rate and Rhythm: Normal rate and regular rhythm. Heart sounds: Normal heart sounds. Pulmonary:      Effort: Pulmonary effort is normal.      Breath sounds: Normal breath sounds. Abdominal:      Palpations: Abdomen is soft. Tenderness: There is no abdominal tenderness. Musculoskeletal:      Cervical back: Normal range of motion and neck supple. Skin:     General: Skin is warm and dry. Findings: No rash. Neurological:      Mental Status: She is alert. Psychiatric:         Mood and Affect: Mood normal.         Behavior: Behavior normal.         Thought Content: Thought content normal.        ASSESSMENT and PLAN    1. Routine general medical examination at a health care facility  2.  Severe obesity ([address]    Current Status:  Satisfactory general health evaluation pending lab results  Obesity-with weight gain    Health Maintenance Recommendations:  COVID-19 immunization with bivalent vaccine  Tdap immunization? Pap smear February 2023    Plan:  Lab studies ordered, further disposition pending lab results if indicated  Avoid dietary starch and sugar and is much as possible follow program of regular aerobic exercise. Return for annual physical exam in 1 year, return sooner with any problems  Please always arrive at least 15 minutes before your scheduled appointment time. Valeria Burciaga MD    Please Note:  This document has been produced using voice recognition software. Unrecognized errors in transcription may be present.

## 2023-02-27 ENCOUNTER — HOSPITAL ENCOUNTER (OUTPATIENT)
Facility: HOSPITAL | Age: 43
Setting detail: SPECIMEN
Discharge: HOME | End: 2023-03-02
Payer: COMMERCIAL

## 2023-02-27 ENCOUNTER — OFFICE VISIT (OUTPATIENT)
Dept: FAMILY MEDICINE CLINIC | Facility: CLINIC | Age: 43
End: 2023-02-27
Payer: COMMERCIAL

## 2023-02-27 VITALS
BODY MASS INDEX: 36.5 KG/M2 | WEIGHT: 206 LBS | TEMPERATURE: 97.3 F | OXYGEN SATURATION: 98 % | DIASTOLIC BLOOD PRESSURE: 84 MMHG | RESPIRATION RATE: 16 BRPM | SYSTOLIC BLOOD PRESSURE: 130 MMHG | HEIGHT: 63 IN | HEART RATE: 92 BPM

## 2023-02-27 DIAGNOSIS — E66.01 SEVERE OBESITY (HCC): ICD-10-CM

## 2023-02-27 DIAGNOSIS — Z00.00 ROUTINE HEALTH MAINTENANCE: ICD-10-CM

## 2023-02-27 DIAGNOSIS — Z00.00 ROUTINE GENERAL MEDICAL EXAMINATION AT A HEALTH CARE FACILITY: Primary | ICD-10-CM

## 2023-02-27 LAB
ALBUMIN SERPL-MCNC: 3.4 G/DL (ref 3.4–5)
ALBUMIN/GLOB SERPL: 0.8 (ref 0.8–1.7)
ALP SERPL-CCNC: 52 U/L (ref 45–117)
ALT SERPL-CCNC: 21 U/L (ref 13–56)
ANION GAP SERPL CALC-SCNC: 6 MMOL/L (ref 3–18)
APPEARANCE UR: CLEAR
AST SERPL-CCNC: 15 U/L (ref 10–38)
BACTERIA URNS QL MICRO: ABNORMAL /HPF
BASOPHILS # BLD: 0 K/UL (ref 0–0.1)
BASOPHILS NFR BLD: 0 % (ref 0–2)
BILIRUB SERPL-MCNC: 0.3 MG/DL (ref 0.2–1)
BILIRUB UR QL: NEGATIVE
BUN SERPL-MCNC: 12 MG/DL (ref 7–18)
BUN/CREAT SERPL: 13 (ref 12–20)
CALCIUM SERPL-MCNC: 8.8 MG/DL (ref 8.5–10.1)
CHLORIDE SERPL-SCNC: 105 MMOL/L (ref 100–111)
CHOLEST SERPL-MCNC: 137 MG/DL
CO2 SERPL-SCNC: 25 MMOL/L (ref 21–32)
COLOR UR: YELLOW
CREAT SERPL-MCNC: 0.93 MG/DL (ref 0.6–1.3)
DIFFERENTIAL METHOD BLD: ABNORMAL
EOSINOPHIL # BLD: 0.1 K/UL (ref 0–0.4)
EOSINOPHIL NFR BLD: 1 % (ref 0–5)
EPITH CASTS URNS QL MICRO: ABNORMAL /LPF (ref 0–5)
ERYTHROCYTE [DISTWIDTH] IN BLOOD BY AUTOMATED COUNT: 13.2 % (ref 11.6–14.5)
EST. AVERAGE GLUCOSE BLD GHB EST-MCNC: 94 MG/DL
GLOBULIN SER CALC-MCNC: 4.5 G/DL (ref 2–4)
GLUCOSE SERPL-MCNC: 88 MG/DL (ref 74–99)
GLUCOSE UR STRIP.AUTO-MCNC: NEGATIVE MG/DL
HBA1C MFR BLD: 4.9 % (ref 4.2–5.6)
HCT VFR BLD AUTO: 36 % (ref 35–45)
HDLC SERPL-MCNC: 46 MG/DL (ref 40–60)
HDLC SERPL: 3 (ref 0–5)
HGB BLD-MCNC: 11.4 G/DL (ref 12–16)
HGB UR QL STRIP: NEGATIVE
IMM GRANULOCYTES # BLD AUTO: 0 K/UL (ref 0–0.04)
IMM GRANULOCYTES NFR BLD AUTO: 0 % (ref 0–0.5)
KETONES UR QL STRIP.AUTO: NEGATIVE MG/DL
LDLC SERPL CALC-MCNC: 31.4 MG/DL (ref 0–100)
LEUKOCYTE ESTERASE UR QL STRIP.AUTO: ABNORMAL
LIPID PANEL: ABNORMAL
LYMPHOCYTES # BLD: 2.9 K/UL (ref 0.9–3.6)
LYMPHOCYTES NFR BLD: 25 % (ref 21–52)
MCH RBC QN AUTO: 28.5 PG (ref 24–34)
MCHC RBC AUTO-ENTMCNC: 31.7 G/DL (ref 31–37)
MCV RBC AUTO: 90 FL (ref 78–100)
MONOCYTES # BLD: 0.7 K/UL (ref 0.05–1.2)
MONOCYTES NFR BLD: 6 % (ref 3–10)
NEUTS SEG # BLD: 8.2 K/UL (ref 1.8–8)
NEUTS SEG NFR BLD: 69 % (ref 40–73)
NITRITE UR QL STRIP.AUTO: NEGATIVE
NRBC # BLD: 0 K/UL (ref 0–0.01)
NRBC BLD-RTO: 0 PER 100 WBC
PH UR STRIP: 8 (ref 5–8)
PLATELET # BLD AUTO: 234 K/UL (ref 135–420)
PMV BLD AUTO: 11.5 FL (ref 9.2–11.8)
POTASSIUM SERPL-SCNC: 4 MMOL/L (ref 3.5–5.5)
PROT SERPL-MCNC: 7.9 G/DL (ref 6.4–8.2)
PROT UR STRIP-MCNC: NEGATIVE MG/DL
RBC # BLD AUTO: 4 M/UL (ref 4.2–5.3)
RBC #/AREA URNS HPF: ABNORMAL /HPF (ref 0–5)
SODIUM SERPL-SCNC: 136 MMOL/L (ref 136–145)
SP GR UR REFRACTOMETRY: 1.01 (ref 1–1.03)
TRIGL SERPL-MCNC: 298 MG/DL
TSH SERPL DL<=0.05 MIU/L-ACNC: 0.74 UIU/ML (ref 0.36–3.74)
UROBILINOGEN UR QL STRIP.AUTO: 1 EU/DL (ref 0.2–1)
VLDLC SERPL CALC-MCNC: 59.6 MG/DL
WBC # BLD AUTO: 11.9 K/UL (ref 4.6–13.2)
WBC URNS QL MICRO: ABNORMAL /HPF (ref 0–4)

## 2023-02-27 PROCEDURE — 85025 COMPLETE CBC W/AUTO DIFF WBC: CPT

## 2023-02-27 PROCEDURE — 99396 PREV VISIT EST AGE 40-64: CPT | Performed by: FAMILY MEDICINE

## 2023-02-27 PROCEDURE — 80053 COMPREHEN METABOLIC PANEL: CPT

## 2023-02-27 PROCEDURE — 83036 HEMOGLOBIN GLYCOSYLATED A1C: CPT

## 2023-02-27 PROCEDURE — 80061 LIPID PANEL: CPT

## 2023-02-27 PROCEDURE — 81001 URINALYSIS AUTO W/SCOPE: CPT

## 2023-02-27 PROCEDURE — 84443 ASSAY THYROID STIM HORMONE: CPT

## 2023-02-27 RX ORDER — DESOGESTREL/ETHINYL ESTRADIOL AND ETHINYL ESTRADIOL 21-5 (28)
KIT ORAL DAILY
COMMUNITY
Start: 2023-02-10

## 2023-02-27 ASSESSMENT — PATIENT HEALTH QUESTIONNAIRE - PHQ9
2. FEELING DOWN, DEPRESSED OR HOPELESS: 0
SUM OF ALL RESPONSES TO PHQ QUESTIONS 1-9: 0
SUM OF ALL RESPONSES TO PHQ QUESTIONS 1-9: 0
SUM OF ALL RESPONSES TO PHQ9 QUESTIONS 1 & 2: 0
1. LITTLE INTEREST OR PLEASURE IN DOING THINGS: 0
SUM OF ALL RESPONSES TO PHQ QUESTIONS 1-9: 0
SUM OF ALL RESPONSES TO PHQ QUESTIONS 1-9: 0

## 2023-02-27 NOTE — PATIENT INSTRUCTIONS
Current Status:  Satisfactory general health evaluation pending lab results  Obesity-with weight gain    Health Maintenance Recommendations:  COVID-19 immunization with bivalent vaccine  Tdap immunization?  Pap smear February 2023    Plan:  Lab studies ordered, further disposition pending lab results if indicated  Avoid dietary starch and sugar and is much as possible follow program of regular aerobic exercise.  Return for annual physical exam in 1 year, return sooner with any problems  Please always arrive at least 15 minutes before your scheduled appointment time.

## 2023-02-27 NOTE — PROGRESS NOTES
Kwan Fine is a 40 y.o. female (: 1980) presenting to address:    Chief Complaint   Patient presents with    Annual Exam    Immunizations    Allergies     Discuss medication options . Vitals:    23 1434   BP: 130/84   Pulse: 92   Resp: 16   Temp: 97.3 °F (36.3 °C)   SpO2: 98%       Coordination of Care Questionaire:   1. \"Have you been to the ER, urgent care clinic since your last visit? Hospitalized since your last visit? \" No    2. \"Have you seen or consulted any other health care providers outside of the [address] [place] [address] since your last visit? \" No     3. For patients aged 39-70: Has the patient had a colonoscopy / FIT/ Cologuard? NA - based on age      If the patient is female:    4. For patients aged 41-77: Has the patient had a mammogram within the past 2 years? NA - based on age or sex      11. For patients aged 21-65: Has the patient had a pap smear? Yes - no Care Gap present    Advanced Directive:   1. Do you have an Advanced Directive? No    2. Would you like information on Advanced Directives?  No

## 2023-07-30 SDOH — ECONOMIC STABILITY: FOOD INSECURITY: WITHIN THE PAST 12 MONTHS, THE FOOD YOU BOUGHT JUST DIDN'T LAST AND YOU DIDN'T HAVE MONEY TO GET MORE.: NEVER TRUE

## 2023-07-30 SDOH — ECONOMIC STABILITY: INCOME INSECURITY: HOW HARD IS IT FOR YOU TO PAY FOR THE VERY BASICS LIKE FOOD, HOUSING, MEDICAL CARE, AND HEATING?: NOT HARD AT ALL

## 2023-07-30 SDOH — ECONOMIC STABILITY: FOOD INSECURITY: WITHIN THE PAST 12 MONTHS, YOU WORRIED THAT YOUR FOOD WOULD RUN OUT BEFORE YOU GOT MONEY TO BUY MORE.: NEVER TRUE

## 2023-07-30 SDOH — ECONOMIC STABILITY: TRANSPORTATION INSECURITY
IN THE PAST 12 MONTHS, HAS LACK OF TRANSPORTATION KEPT YOU FROM MEETINGS, WORK, OR FROM GETTING THINGS NEEDED FOR DAILY LIVING?: NO

## 2023-07-30 SDOH — ECONOMIC STABILITY: HOUSING INSECURITY
IN THE LAST 12 MONTHS, WAS THERE A TIME WHEN YOU DID NOT HAVE A STEADY PLACE TO SLEEP OR SLEPT IN A SHELTER (INCLUDING NOW)?: NO

## 2023-07-31 NOTE — PROGRESS NOTES
HISTORY OF Jeff Mariscal  is a 44 y.o. y.o. female    She reports left ear discomfort, a possible stye in the right eye and tingling in the left hand. The ear and eye symptoms started roughly a month ago and the tingling in the left hand has been present for at least 2 months. She describes the ear discomfort as not painful but like fluid in her ear. Her right eye no longer hurts but still looks a little puffy to her. She notes the hand symptoms mostly at night and these occur intermittently. Mr#: [id number]      No past medical history on file. No past surgical history on file. Family History   Problem Relation Age of Onset    Diabetes Maternal Grandmother     Cancer Neg Hx     Glaucoma Maternal Grandmother     Diabetes Brother     Hypertension Father        No Known Allergies    Social History     Tobacco Use   Smoking Status Never   Smokeless Tobacco Never       Social History     Substance and Sexual Activity   Alcohol Use Yes       Immunization History   Administered Date(s) Administered    COVID-19, J&J, (age 18y+), IM, 0.5 mL 04/08/2021    COVID-19, PFIZER PURPLE top, DILUTE for use, (age 15 y+), 30mcg/0.3mL 12/14/2021    Hep A, HAVRIX, VAQTA, (age 19y+), IM, 1mL 09/24/2019, 08/30/2021    Influenza Quadv 11/01/2016    Influenza Virus Vaccine 01/10/2023    PPD Test 08/06/2019    TD 5LF, Ava Barboza, (age 7y+), IM, 0.5mL 09/24/2019    Yellow Fever (YF-Vax) 11/12/2019       Patient Active Problem List   Diagnosis    Obesity (BMI 30-39. 9)         Current Outpatient Medications:     AZURETTE 0.15-0.02/0.01 MG (21/5) per tablet, Take by mouth daily, Disp: , Rfl:       Review of Systems   HENT:  Positive for ear pain (left ear pain x 1 month). Negative for hearing loss and sore throat. Eyes:  Negative for photophobia, pain, discharge, redness, itching and visual disturbance. \"Stye\"?  X 3 weeks, no pain now but still puffy   Neurological:  Positive for numbness (Episodic

## 2023-08-02 ENCOUNTER — OFFICE VISIT (OUTPATIENT)
Dept: FAMILY MEDICINE CLINIC | Facility: CLINIC | Age: 43
End: 2023-08-02
Payer: COMMERCIAL

## 2023-08-02 VITALS
HEART RATE: 80 BPM | RESPIRATION RATE: 16 BRPM | TEMPERATURE: 98.2 F | BODY MASS INDEX: 36.14 KG/M2 | OXYGEN SATURATION: 98 % | WEIGHT: 204 LBS | HEIGHT: 63 IN | DIASTOLIC BLOOD PRESSURE: 80 MMHG | SYSTOLIC BLOOD PRESSURE: 120 MMHG

## 2023-08-02 DIAGNOSIS — G56.02 CARPAL TUNNEL SYNDROME OF LEFT WRIST: Primary | ICD-10-CM

## 2023-08-02 DIAGNOSIS — H69.82 EUSTACHIAN TUBE DYSFUNCTION, LEFT: ICD-10-CM

## 2023-08-02 DIAGNOSIS — Z00.00 ROUTINE GENERAL MEDICAL EXAMINATION AT A HEALTH CARE FACILITY: Primary | ICD-10-CM

## 2023-08-02 PROCEDURE — 99213 OFFICE O/P EST LOW 20 MIN: CPT | Performed by: FAMILY MEDICINE

## 2023-08-02 ASSESSMENT — ENCOUNTER SYMPTOMS
EYE ITCHING: 0
EYE DISCHARGE: 0
EYE PAIN: 0
PHOTOPHOBIA: 0
EYE REDNESS: 0
SORE THROAT: 0

## 2023-08-02 NOTE — PATIENT INSTRUCTIONS
Current Status:  Symptoms and exam consistent with mild carpal tunnel syndrome left wrist  Likely eustachian tube dysfunction left ear  No significant abnormalities noted regarding the right eye    Health Maintenance Recommendations:  COVID-19 immunization with bivalent vaccine  Tdap immunization  Influenza immunization recommended later this fall    Plan:  Obtain and wear a cock-up wrist splint nightly, report progression of symptoms  Frequent pressure equalization exercises, OTC Flonase nasal spray 2 sprays to each side of the nose daily  Massage eyelids with eyes shut and warm wet face cloth when in the shower  Schedule lab appointment followed by annual physical exam appointment after 2/27/2024, return sooner with any problems

## 2025-03-26 DIAGNOSIS — Z00.00 ROUTINE HEALTH MAINTENANCE: Primary | ICD-10-CM

## 2025-03-26 ASSESSMENT — ENCOUNTER SYMPTOMS
VOMITING: 0
DIARRHEA: 0
COUGH: 0
WHEEZING: 0
NAUSEA: 0
ABDOMINAL PAIN: 0
SHORTNESS OF BREATH: 0
SORE THROAT: 0
CONSTIPATION: 0
BLOOD IN STOOL: 0
ANAL BLEEDING: 0

## 2025-03-26 NOTE — PROGRESS NOTES
HISTORY OF PRESENT ILLNESS  Siva Wilson  is a 45 y.o. y.o. female    She presents for health assessment, preventative care and follow-up with a history of obesity.        Mr#: [id number]      No past medical history on file.    No past surgical history on file.    Family History   Problem Relation Age of Onset    Diabetes Maternal Grandmother     Glaucoma Maternal Grandmother     Cancer Maternal Grandmother     Diabetes Brother     Heart Attack Brother     Hypertension Father        No Known Allergies    Social History     Tobacco Use   Smoking Status Never   Smokeless Tobacco Never       Social History     Substance and Sexual Activity   Alcohol Use Yes       Immunization History   Administered Date(s) Administered    COVID-19, J&J, (age 18y+), IM, 0.5 mL 04/08/2021    COVID-19, PFIZER PURPLE top, DILUTE for use, (age 12 y+), 30mcg/0.3mL 12/14/2021    Hep A, HAVRIX, VAQTA, (age 19y+), IM, 1mL 09/24/2019, 08/30/2021    Influenza Quadv 11/01/2016    Influenza Virus Vaccine 01/10/2023    PPD Test 08/06/2019    TD 5LF, TENIVAC, (age 7y+), IM, 0.5mL 09/24/2019    Yellow Fever (YF-Vax) 11/12/2019       Patient Active Problem List   Diagnosis    Obesity (BMI 30-39.9)         Current Outpatient Medications:     AZURETTE 0.15-0.02/0.01 MG (21/5) per tablet, Take by mouth daily, Disp: , Rfl:       Review of Systems   Constitutional:  Negative for activity change, appetite change, fever and unexpected weight change.   HENT:  Positive for ear pain (left otalgia off and on). Negative for congestion, hearing loss, sore throat and tinnitus.    Eyes:  Negative for visual disturbance.   Respiratory:  Negative for cough, shortness of breath and wheezing.    Cardiovascular:  Negative for chest pain, palpitations and leg swelling.   Gastrointestinal:  Negative for abdominal pain, anal bleeding, blood in stool, constipation, diarrhea, nausea and vomiting.   Genitourinary:  Negative for difficulty urinating, dysuria, hematuria

## 2025-03-27 ENCOUNTER — OFFICE VISIT (OUTPATIENT)
Dept: FAMILY MEDICINE CLINIC | Facility: CLINIC | Age: 45
End: 2025-03-27
Payer: COMMERCIAL

## 2025-03-27 ENCOUNTER — HOSPITAL ENCOUNTER (OUTPATIENT)
Facility: HOSPITAL | Age: 45
Setting detail: SPECIMEN
Discharge: HOME | End: 2025-03-30
Payer: COMMERCIAL

## 2025-03-27 VITALS
TEMPERATURE: 97.6 F | SYSTOLIC BLOOD PRESSURE: 118 MMHG | HEART RATE: 69 BPM | RESPIRATION RATE: 16 BRPM | BODY MASS INDEX: 33.66 KG/M2 | DIASTOLIC BLOOD PRESSURE: 80 MMHG | WEIGHT: 190 LBS | HEIGHT: 63 IN | OXYGEN SATURATION: 98 %

## 2025-03-27 DIAGNOSIS — Z00.00 ROUTINE HEALTH MAINTENANCE: ICD-10-CM

## 2025-03-27 DIAGNOSIS — R11.0 NAUSEA: ICD-10-CM

## 2025-03-27 DIAGNOSIS — Z00.00 ROUTINE GENERAL MEDICAL EXAMINATION AT A HEALTH CARE FACILITY: Primary | ICD-10-CM

## 2025-03-27 LAB
ALBUMIN SERPL-MCNC: 3.7 G/DL (ref 3.4–5)
ALBUMIN/GLOB SERPL: 0.8 (ref 0.8–1.7)
ALP SERPL-CCNC: 65 U/L (ref 45–117)
ALT SERPL-CCNC: 32 U/L (ref 13–56)
ANION GAP SERPL CALC-SCNC: 4 MMOL/L (ref 3–18)
APPEARANCE UR: CLEAR
AST SERPL-CCNC: 49 U/L (ref 10–38)
BASOPHILS # BLD: 0.03 K/UL (ref 0–0.1)
BASOPHILS NFR BLD: 0.3 % (ref 0–2)
BILIRUB SERPL-MCNC: 0.9 MG/DL (ref 0.2–1)
BILIRUB UR QL: NEGATIVE
BUN SERPL-MCNC: 11 MG/DL (ref 7–18)
BUN/CREAT SERPL: 11 (ref 12–20)
CALCIUM SERPL-MCNC: 9.4 MG/DL (ref 8.5–10.1)
CHLORIDE SERPL-SCNC: 104 MMOL/L (ref 100–111)
CHOLEST SERPL-MCNC: 186 MG/DL
CO2 SERPL-SCNC: 27 MMOL/L (ref 21–32)
COLOR UR: YELLOW
CREAT SERPL-MCNC: 0.96 MG/DL (ref 0.6–1.3)
DIFFERENTIAL METHOD BLD: NORMAL
EOSINOPHIL # BLD: 0.27 K/UL (ref 0–0.4)
EOSINOPHIL NFR BLD: 2.9 % (ref 0–5)
ERYTHROCYTE [DISTWIDTH] IN BLOOD BY AUTOMATED COUNT: 12.5 % (ref 11.6–14.5)
EST. AVERAGE GLUCOSE BLD GHB EST-MCNC: 111 MG/DL
GLOBULIN SER CALC-MCNC: 4.7 G/DL (ref 2–4)
GLUCOSE SERPL-MCNC: 81 MG/DL (ref 74–99)
GLUCOSE UR STRIP.AUTO-MCNC: NEGATIVE MG/DL
HBA1C MFR BLD: 5.5 % (ref 4.2–5.6)
HCT VFR BLD AUTO: 39.9 % (ref 35–45)
HDLC SERPL-MCNC: 74 MG/DL (ref 40–60)
HDLC SERPL: 2.5 (ref 0–5)
HGB BLD-MCNC: 12.6 G/DL (ref 12–16)
HGB UR QL STRIP: NEGATIVE
IMM GRANULOCYTES # BLD AUTO: 0.02 K/UL (ref 0–0.04)
IMM GRANULOCYTES NFR BLD AUTO: 0.2 % (ref 0–0.5)
KETONES UR QL STRIP.AUTO: NEGATIVE MG/DL
LDLC SERPL CALC-MCNC: 103 MG/DL (ref 0–100)
LEUKOCYTE ESTERASE UR QL STRIP.AUTO: NEGATIVE
LIPID PANEL: ABNORMAL
LYMPHOCYTES # BLD: 2.36 K/UL (ref 0.9–3.6)
LYMPHOCYTES NFR BLD: 25.5 % (ref 21–52)
MCH RBC QN AUTO: 29.2 PG (ref 24–34)
MCHC RBC AUTO-ENTMCNC: 31.6 G/DL (ref 31–37)
MCV RBC AUTO: 92.6 FL (ref 78–100)
MONOCYTES # BLD: 0.51 K/UL (ref 0.05–1.2)
MONOCYTES NFR BLD: 5.5 % (ref 3–10)
NEUTS SEG # BLD: 6.05 K/UL (ref 1.8–8)
NEUTS SEG NFR BLD: 65.6 % (ref 40–73)
NITRITE UR QL STRIP.AUTO: NEGATIVE
NRBC # BLD: 0 K/UL (ref 0–0.01)
NRBC BLD-RTO: 0 PER 100 WBC
PH UR STRIP: 5.5 (ref 5–8)
PLATELET # BLD AUTO: 206 K/UL (ref 135–420)
PMV BLD AUTO: 11.7 FL (ref 9.2–11.8)
POTASSIUM SERPL-SCNC: 4.3 MMOL/L (ref 3.5–5.5)
PROT SERPL-MCNC: 8.4 G/DL (ref 6.4–8.2)
PROT UR STRIP-MCNC: NEGATIVE MG/DL
RBC # BLD AUTO: 4.31 M/UL (ref 4.2–5.3)
SODIUM SERPL-SCNC: 135 MMOL/L (ref 136–145)
SP GR UR REFRACTOMETRY: 1.02 (ref 1–1.03)
TRIGL SERPL-MCNC: 45 MG/DL
TSH SERPL DL<=0.05 MIU/L-ACNC: 0.71 UIU/ML (ref 0.36–3.74)
UROBILINOGEN UR QL STRIP.AUTO: 0.2 EU/DL (ref 0.2–1)
VLDLC SERPL CALC-MCNC: 9 MG/DL
WBC # BLD AUTO: 9.2 K/UL (ref 4.6–13.2)

## 2025-03-27 PROCEDURE — 36415 COLL VENOUS BLD VENIPUNCTURE: CPT

## 2025-03-27 PROCEDURE — 84443 ASSAY THYROID STIM HORMONE: CPT

## 2025-03-27 PROCEDURE — 80053 COMPREHEN METABOLIC PANEL: CPT

## 2025-03-27 PROCEDURE — 99396 PREV VISIT EST AGE 40-64: CPT | Performed by: FAMILY MEDICINE

## 2025-03-27 PROCEDURE — 85025 COMPLETE CBC W/AUTO DIFF WBC: CPT

## 2025-03-27 PROCEDURE — 83036 HEMOGLOBIN GLYCOSYLATED A1C: CPT

## 2025-03-27 PROCEDURE — 81003 URINALYSIS AUTO W/O SCOPE: CPT

## 2025-03-27 PROCEDURE — 80061 LIPID PANEL: CPT

## 2025-03-27 RX ORDER — ONDANSETRON 4 MG/1
4 TABLET, ORALLY DISINTEGRATING ORAL 3 TIMES DAILY PRN
Qty: 60 TABLET | Refills: 3 | Status: SHIPPED | OUTPATIENT
Start: 2025-03-27

## 2025-03-27 RX ORDER — MULTIVITAMIN WITH IRON
1 TABLET ORAL DAILY
COMMUNITY

## 2025-03-27 RX ORDER — NICOTINE POLACRILEX 2 MG
GUM BUCCAL
COMMUNITY

## 2025-03-27 SDOH — ECONOMIC STABILITY: FOOD INSECURITY: WITHIN THE PAST 12 MONTHS, THE FOOD YOU BOUGHT JUST DIDN'T LAST AND YOU DIDN'T HAVE MONEY TO GET MORE.: NEVER TRUE

## 2025-03-27 SDOH — ECONOMIC STABILITY: FOOD INSECURITY: WITHIN THE PAST 12 MONTHS, YOU WORRIED THAT YOUR FOOD WOULD RUN OUT BEFORE YOU GOT MONEY TO BUY MORE.: NEVER TRUE

## 2025-03-27 ASSESSMENT — PATIENT HEALTH QUESTIONNAIRE - PHQ9
SUM OF ALL RESPONSES TO PHQ QUESTIONS 1-9: 0
1. LITTLE INTEREST OR PLEASURE IN DOING THINGS: NOT AT ALL
2. FEELING DOWN, DEPRESSED OR HOPELESS: NOT AT ALL
SUM OF ALL RESPONSES TO PHQ QUESTIONS 1-9: 0

## 2025-03-27 NOTE — PATIENT INSTRUCTIONS
Current Status:  Obesity much improved  Still reports nausea with ocean travel has found ondansetron helpful    Health Maintenance Recommendations:  Influenza immunization every fall  Hepatitis B immunization series-current ([employer])  Mammogram-scheduled  Pap smear due February 2026   Colorectal cancer screening to begin at age 45    Plan:  Lab studies ordered, further disposition pending lab results if indicated  Avoid dietary starch and sugar and as much as possible follow program of regular aerobic exercise.  Return for annual physical exam and follow-up in 1 year, return sooner with any problems  Please arrive at least 15 minutes prior to your scheduled appointment time

## 2025-03-27 NOTE — PROGRESS NOTES
Siva Wilson is a 45 y.o. female (: 1980) presenting to address:    Chief Complaint   Patient presents with    Ear Pain     Left ear aches every now and then. Seen in er a few months ago and they said nothing was wrong.        There were no vitals filed for this visit.    \"Have you been to the ER, urgent care clinic since your last visit?  Hospitalized since your last visit?\"    Yes er     “Have you seen or consulted any other health care providers outside of [facility] since your last visit?”    NO       Have you had a mammogram?”   Scheduled for tomorrow.     No breast cancer screening on file

## 2025-03-28 ENCOUNTER — TELEPHONE (OUTPATIENT)
Dept: FAMILY MEDICINE CLINIC | Facility: CLINIC | Age: 45
End: 2025-03-28

## 2025-03-28 ENCOUNTER — RESULTS FOLLOW-UP (OUTPATIENT)
Dept: FAMILY MEDICINE CLINIC | Facility: CLINIC | Age: 45
End: 2025-03-28

## 2025-03-28 NOTE — TELEPHONE ENCOUNTER
Pt is returning a call. I did informed her that a [employer] message was sent and she stated she will read it.